# Patient Record
Sex: MALE | Race: WHITE | NOT HISPANIC OR LATINO | Employment: FULL TIME | URBAN - METROPOLITAN AREA
[De-identification: names, ages, dates, MRNs, and addresses within clinical notes are randomized per-mention and may not be internally consistent; named-entity substitution may affect disease eponyms.]

---

## 2023-04-24 ENCOUNTER — OFFICE VISIT (OUTPATIENT)
Dept: PRIMARY CARE | Facility: CLINIC | Age: 58
End: 2023-04-24
Payer: COMMERCIAL

## 2023-04-24 VITALS — BODY MASS INDEX: 32.32 KG/M2 | SYSTOLIC BLOOD PRESSURE: 126 MMHG | WEIGHT: 245 LBS | DIASTOLIC BLOOD PRESSURE: 80 MMHG

## 2023-04-24 DIAGNOSIS — E78.00 ELEVATED LDL CHOLESTEROL LEVEL: ICD-10-CM

## 2023-04-24 DIAGNOSIS — F10.10 ALCOHOL ABUSE: ICD-10-CM

## 2023-04-24 DIAGNOSIS — Q23.1 BICUSPID AORTIC VALVE (HHS-HCC): ICD-10-CM

## 2023-04-24 DIAGNOSIS — F39 MOOD DISORDER (CMS-HCC): Primary | ICD-10-CM

## 2023-04-24 PROBLEM — I77.819 DILATATION OF AORTA (CMS-HCC): Status: ACTIVE | Noted: 2023-04-24

## 2023-04-24 PROBLEM — I25.10 CORONARY ARTERY CALCIFICATION SEEN ON CAT SCAN: Status: ACTIVE | Noted: 2023-04-24

## 2023-04-24 PROBLEM — Q23.81 BICUSPID AORTIC VALVE: Status: ACTIVE | Noted: 2023-04-24

## 2023-04-24 PROBLEM — G47.00 PERSISTENT INSOMNIA: Status: ACTIVE | Noted: 2023-04-24

## 2023-04-24 PROBLEM — B35.1 ONYCHOMYCOSIS: Status: ACTIVE | Noted: 2023-04-24

## 2023-04-24 PROCEDURE — 99214 OFFICE O/P EST MOD 30 MIN: CPT | Performed by: FAMILY MEDICINE

## 2023-04-24 PROCEDURE — 1036F TOBACCO NON-USER: CPT | Performed by: FAMILY MEDICINE

## 2023-04-24 RX ORDER — DISULFIRAM 250 MG/1
250 TABLET ORAL DAILY
Qty: 90 TABLET | Refills: 1 | Status: SHIPPED | OUTPATIENT
Start: 2023-04-24 | End: 2023-09-27 | Stop reason: SDUPTHER

## 2023-04-24 RX ORDER — ROSUVASTATIN CALCIUM 10 MG/1
1 TABLET, COATED ORAL DAILY
COMMUNITY
Start: 2023-04-17 | End: 2023-10-10 | Stop reason: SDUPTHER

## 2023-04-24 RX ORDER — QUETIAPINE FUMARATE 25 MG/1
25 TABLET, FILM COATED ORAL NIGHTLY
Qty: 90 TABLET | Refills: 1 | Status: SHIPPED | OUTPATIENT
Start: 2023-04-24 | End: 2023-09-27 | Stop reason: SDUPTHER

## 2023-04-24 RX ORDER — DISULFIRAM 250 MG/1
1 TABLET ORAL DAILY
COMMUNITY
End: 2023-04-24 | Stop reason: SDUPTHER

## 2023-04-24 RX ORDER — QUETIAPINE FUMARATE 25 MG/1
25 TABLET, FILM COATED ORAL NIGHTLY
COMMUNITY
End: 2023-04-24 | Stop reason: SDUPTHER

## 2023-04-24 ASSESSMENT — PATIENT HEALTH QUESTIONNAIRE - PHQ9
1. LITTLE INTEREST OR PLEASURE IN DOING THINGS: NOT AT ALL
SUM OF ALL RESPONSES TO PHQ9 QUESTIONS 1 AND 2: 0
2. FEELING DOWN, DEPRESSED OR HOPELESS: NOT AT ALL

## 2023-04-24 NOTE — PROGRESS NOTES
Subjective   Blayne Covarrubias is a 57 y.o. male who presents for Alcohol Problem    HPI  1.  Mood disorder  Currently taking Seroquel 25 mg at bedtime  Stable at this medication dosage and would continue    2.  Alcohol abuse  Currently taking Antabuse 250 mg daily  Like to continue with this as he has been on it for a long time    3.  Elevated LDL cholesterol  Started on rosuvastatin 10 mg daily by cardiology recently.  Currently tolerating medication without side effects, including myalgias    4.  Bicuspid aortic valve  Found incidentally on echo by cardiology recently.  Does also have moderate aortic insufficiency.  Cardiologist would like to see him yearly for periodic monitoring    ROS: All pertinent positive symptoms are included in the history of present illness.    All other systems have been reviewed and are negative and noncontributory to this patient's current ailments.    Objective     Vitals:    04/24/23 1346   BP: 126/80   Weight: 111 kg (245 lb)       Physical Exam  CONSTITUTIONAL - well nourished, well developed, looks like stated age, in no acute distress, not ill-appearing, and not tired appearing  SKIN - No lesions or rashes visualized.   HEAD - Atraumatic, normocephalic.  EYES - EOMI with normal external exam  RESP - clear to auscultation, no wheezing, no crackles and no rales  CARDIAC - regular rate and rhythm, 2/6 systolic murmur, no skipped beats  EXTREMITIES - no edema, no deformities  PSYCHIATRIC - alert, oriented to time, place, person and no difficulty with speech or language      Assessment/Plan   Problem List Items Addressed This Visit       Alcohol abuse     Continue disulfiram 250 mg daily         Relevant Medications    disulfiram (Antabuse) 250 mg tablet    Elevated LDL cholesterol level     Continue rosuvastatin 10 mg daily as prescribed by cardiology         Mood disorder (CMS/[facility]) - Primary     Continue Seroquel 25 mg at bedtime         Relevant Medications    QUEtiapine (SEROquel) 25  mg tablet    Bicuspid aortic valve     Continue following with Dr. Saavedra as he recommends for monitoring

## 2023-09-27 ENCOUNTER — OFFICE VISIT (OUTPATIENT)
Dept: PRIMARY CARE | Facility: CLINIC | Age: 58
End: 2023-09-27
Payer: COMMERCIAL

## 2023-09-27 VITALS
SYSTOLIC BLOOD PRESSURE: 128 MMHG | HEART RATE: 70 BPM | DIASTOLIC BLOOD PRESSURE: 80 MMHG | WEIGHT: 243 LBS | BODY MASS INDEX: 32.06 KG/M2

## 2023-09-27 DIAGNOSIS — E78.2 MIXED HYPERLIPIDEMIA: Primary | ICD-10-CM

## 2023-09-27 DIAGNOSIS — F10.10 ALCOHOL ABUSE: ICD-10-CM

## 2023-09-27 DIAGNOSIS — F39 MOOD DISORDER (CMS-HCC): ICD-10-CM

## 2023-09-27 DIAGNOSIS — G47.00 PERSISTENT INSOMNIA: ICD-10-CM

## 2023-09-27 PROBLEM — F10.11 HISTORY OF ALCOHOL ABUSE: Status: RESOLVED | Noted: 2023-09-27 | Resolved: 2023-09-27

## 2023-09-27 PROBLEM — I35.1 NONRHEUMATIC AORTIC (VALVE) INSUFFICIENCY: Status: ACTIVE | Noted: 2023-09-27

## 2023-09-27 PROBLEM — F10.11 HISTORY OF ALCOHOL ABUSE: Status: ACTIVE | Noted: 2023-09-27

## 2023-09-27 PROBLEM — I25.10 ATHEROSCLEROTIC HEART DISEASE OF NATIVE CORONARY ARTERY WITHOUT ANGINA PECTORIS: Status: ACTIVE | Noted: 2023-09-27

## 2023-09-27 PROCEDURE — 1036F TOBACCO NON-USER: CPT | Performed by: FAMILY MEDICINE

## 2023-09-27 PROCEDURE — 99214 OFFICE O/P EST MOD 30 MIN: CPT | Performed by: FAMILY MEDICINE

## 2023-09-27 RX ORDER — QUETIAPINE FUMARATE 25 MG/1
25 TABLET, FILM COATED ORAL NIGHTLY
Qty: 90 TABLET | Refills: 1 | Status: SHIPPED | OUTPATIENT
Start: 2023-09-27 | End: 2023-10-20 | Stop reason: SDUPTHER

## 2023-09-27 RX ORDER — DISULFIRAM 250 MG/1
250 TABLET ORAL DAILY
Qty: 90 TABLET | Refills: 1 | Status: SHIPPED | OUTPATIENT
Start: 2023-09-27 | End: 2023-10-20 | Stop reason: SDUPTHER

## 2023-09-27 ASSESSMENT — PATIENT HEALTH QUESTIONNAIRE - PHQ9
2. FEELING DOWN, DEPRESSED OR HOPELESS: NOT AT ALL
SUM OF ALL RESPONSES TO PHQ9 QUESTIONS 1 AND 2: 0
1. LITTLE INTEREST OR PLEASURE IN DOING THINGS: NOT AT ALL

## 2023-09-27 NOTE — PROGRESS NOTES
Subjective   Patient ID: Blayne Covarrubias is a 57 y.o. male who presents for Insomnia and Hyperlipidemia.    HPI  1.  Alcohol abuse history.  Sober for roughly 20 years  Uses Disulfiram 250mg every day  Initially prescribed by psychiatry, and it is continued through this office  At this juncture, not willing to discontinue    2.  Insomnia.  Controlled on Quetiapine 25mg every day  Unable to fall sleep without it, requesting refill    3.  Hyperlipidemia.  Recently started on Rosuvastatin 10mg every day cardiology  Denies any medication side effects including myalgias  Denies ACS symptoms    Review of Systems  All pertinent positive symptoms are included in the history of present illness.    All other systems have been reviewed and are negative and noncontributory to this patient's current ailments.     No Known Allergies     Immunization History   Administered Date(s) Administered    Flu vaccine (IIV4), preservative free *Check age/dose* 10/09/2018    Influenza, injectable, MDCK, preservative free, quadrivalent 12/14/2021, 10/27/2022    Influenza, injectable, quadrivalent 09/20/2019    Influenza, seasonal, injectable, preservative free 09/20/2015, 09/25/2016    Pfizer COVID-19 vaccine, bivalent, age 12 years and older (30 mcg/0.3 mL) 10/27/2022    Pfizer Purple Cap SARS-CoV-2 03/22/2021, 04/19/2021, 12/14/2021    Tdap vaccine, age 7 year and older (BOOSTRIX) 11/25/2015, 05/27/2021    Zoster vaccine, recombinant, adult (SHINGRIX) 12/30/2022       Objective   Vitals:    09/27/23 0946   BP: 128/80   Pulse: 70   Weight: 110 kg (243 lb)       Physical Exam  CONSTITUTIONAL - well nourished, well developed, looks like stated age, in no acute distress, not ill-appearing, and not tired appearing  SKIN - normal skin color and pigmentation, normal skin turgor without rash, lesions, or nodules visualized  HEAD - no trauma, normocephalic  NECK - supple without rigidity, no neck mass was observed, no thyromegaly or thyroid  nodules  CHEST - clear to auscultation, no wheezing, no crackles and no rales, good effort  CARDIAC - regular rate and regular rhythm, no skipped beats, no murmur  EXTREMITIES - no edema, no deformities  NEUROLOGICAL - normal gait, normal balance, normal motor, no ataxia, alert, oriented and no focal signs  PSYCHIATRIC - alert, pleasant and cordial, age-appropriate  IMMUNOLOGIC - no cervical lymphadenopathy     Assessment/Plan   Problem List Items Addressed This Visit       Alcohol abuse     Stable, no changes to medication recommended         Relevant Medications    disulfiram (Antabuse) 250 mg tablet    Mood disorder (CMS/HCC)     Stable, no changes to medication recommended         Relevant Medications    QUEtiapine (SEROquel) 25 mg tablet    Persistent insomnia     Stable. No changes to medical management          Mixed hyperlipidemia - Primary     Stable. No changes to medical management   Continue to follow with cardiology per protocol         Relevant Orders    Comprehensive metabolic panel    Lipid panel     I reviewed Dr. Harris's documentation and discussed the patient with the attendee. I agree with the attendee's medical decision making as documented on the attendee's note.

## 2023-09-27 NOTE — ASSESSMENT & PLAN NOTE
Chief Complaint   Patient presents with    Flank Pain     right flank pain and hematuria onset last night, today tripped and fell onto right side on bladder stimulator and thinks in moved, down for approx 45 min until EMS arrived and assisted her up, denies hitting head or loc    Fall Stable. No changes to medical management   Continue to follow with cardiology per protocol

## 2023-10-09 ENCOUNTER — LAB (OUTPATIENT)
Dept: LAB | Facility: LAB | Age: 58
End: 2023-10-09
Payer: COMMERCIAL

## 2023-10-09 DIAGNOSIS — E78.2 MIXED HYPERLIPIDEMIA: ICD-10-CM

## 2023-10-09 LAB
ALBUMIN SERPL BCP-MCNC: 4.4 G/DL (ref 3.4–5)
ALP SERPL-CCNC: 46 U/L (ref 33–120)
ALT SERPL W P-5'-P-CCNC: 26 U/L (ref 10–52)
ANION GAP SERPL CALC-SCNC: 10 MMOL/L (ref 10–20)
AST SERPL W P-5'-P-CCNC: 16 U/L (ref 9–39)
BILIRUB SERPL-MCNC: 0.7 MG/DL (ref 0–1.2)
BUN SERPL-MCNC: 19 MG/DL (ref 6–23)
CALCIUM SERPL-MCNC: 9.6 MG/DL (ref 8.6–10.3)
CHLORIDE SERPL-SCNC: 105 MMOL/L (ref 98–107)
CHOLEST SERPL-MCNC: 125 MG/DL (ref 0–199)
CHOLESTEROL/HDL RATIO: 2.8
CO2 SERPL-SCNC: 27 MMOL/L (ref 21–32)
CREAT SERPL-MCNC: 1.3 MG/DL (ref 0.5–1.3)
GFR SERPL CREATININE-BSD FRML MDRD: 63 ML/MIN/1.73M*2
GLUCOSE SERPL-MCNC: 81 MG/DL (ref 74–99)
HDLC SERPL-MCNC: 44.4 MG/DL
LDLC SERPL CALC-MCNC: 66 MG/DL (ref 140–190)
NON HDL CHOLESTEROL: 81 MG/DL (ref 0–149)
POTASSIUM SERPL-SCNC: 4 MMOL/L (ref 3.5–5.3)
PROT SERPL-MCNC: 6.7 G/DL (ref 6.4–8.2)
SODIUM SERPL-SCNC: 138 MMOL/L (ref 136–145)
TRIGL SERPL-MCNC: 74 MG/DL (ref 0–149)
VLDL: 15 MG/DL (ref 0–40)

## 2023-10-09 PROCEDURE — 36415 COLL VENOUS BLD VENIPUNCTURE: CPT

## 2023-10-09 PROCEDURE — 80053 COMPREHEN METABOLIC PANEL: CPT

## 2023-10-09 PROCEDURE — 80061 LIPID PANEL: CPT

## 2023-10-10 DIAGNOSIS — E78.00 ELEVATED LDL CHOLESTEROL LEVEL: Primary | ICD-10-CM

## 2023-10-10 RX ORDER — ROSUVASTATIN CALCIUM 10 MG/1
10 TABLET, COATED ORAL DAILY
Qty: 90 TABLET | Refills: 1 | Status: SHIPPED | OUTPATIENT
Start: 2023-10-10 | End: 2024-01-10 | Stop reason: SDUPTHER

## 2023-10-10 NOTE — RESULT ENCOUNTER NOTE
Cholesterol, sugar, kidney, liver and electrolytes are excellent.  No changes to medication recommended that I sent the medication over to the pharmacy for 6 months just now

## 2023-10-15 DIAGNOSIS — F10.10 ALCOHOL ABUSE: ICD-10-CM

## 2023-10-15 DIAGNOSIS — F39 MOOD DISORDER (CMS-HCC): ICD-10-CM

## 2023-10-17 RX ORDER — QUETIAPINE FUMARATE 25 MG/1
25 TABLET, FILM COATED ORAL NIGHTLY
Qty: 90 TABLET | Refills: 0 | OUTPATIENT
Start: 2023-10-17 | End: 2024-04-14

## 2023-10-21 RX ORDER — QUETIAPINE FUMARATE 25 MG/1
25 TABLET, FILM COATED ORAL NIGHTLY
Qty: 90 TABLET | Refills: 1 | Status: SHIPPED | OUTPATIENT
Start: 2023-10-21 | End: 2024-04-03

## 2023-10-21 RX ORDER — DISULFIRAM 250 MG/1
250 TABLET ORAL DAILY
Qty: 90 TABLET | Refills: 1 | Status: SHIPPED | OUTPATIENT
Start: 2023-10-21 | End: 2024-04-25 | Stop reason: SDUPTHER

## 2024-01-09 DIAGNOSIS — E78.00 ELEVATED LDL CHOLESTEROL LEVEL: ICD-10-CM

## 2024-01-10 RX ORDER — ROSUVASTATIN CALCIUM 10 MG/1
10 TABLET, COATED ORAL DAILY
Qty: 90 TABLET | Refills: 0 | Status: SHIPPED | OUTPATIENT
Start: 2024-01-10 | End: 2024-04-09

## 2024-03-25 ENCOUNTER — OFFICE VISIT (OUTPATIENT)
Dept: CARDIOLOGY | Facility: CLINIC | Age: 59
End: 2024-03-25
Payer: COMMERCIAL

## 2024-03-25 ENCOUNTER — HOSPITAL ENCOUNTER (OUTPATIENT)
Dept: CARDIOLOGY | Facility: CLINIC | Age: 59
Discharge: HOME | End: 2024-03-25
Payer: COMMERCIAL

## 2024-03-25 VITALS
WEIGHT: 234 LBS | BODY MASS INDEX: 29.09 KG/M2 | TEMPERATURE: 98 F | HEART RATE: 73 BPM | HEIGHT: 75 IN | DIASTOLIC BLOOD PRESSURE: 71 MMHG | SYSTOLIC BLOOD PRESSURE: 112 MMHG

## 2024-03-25 DIAGNOSIS — I25.10 ATHEROSCLEROSIS OF NATIVE CORONARY ARTERY OF NATIVE HEART WITHOUT ANGINA PECTORIS: ICD-10-CM

## 2024-03-25 DIAGNOSIS — E78.2 MIXED HYPERLIPIDEMIA: ICD-10-CM

## 2024-03-25 DIAGNOSIS — Q23.1 BICUSPID AORTIC VALVE (HHS-HCC): ICD-10-CM

## 2024-03-25 DIAGNOSIS — Q23.0 CONGENITAL STENOSIS OF AORTIC VALVE (HHS-HCC): ICD-10-CM

## 2024-03-25 DIAGNOSIS — I25.10 ATHEROSCLEROSIS OF NATIVE CORONARY ARTERY OF NATIVE HEART, UNSPECIFIED WHETHER ANGINA PRESENT: ICD-10-CM

## 2024-03-25 DIAGNOSIS — Q23.1 CONGENITAL INSUFFICIENCY OF AORTIC VALVE (HHS-HCC): ICD-10-CM

## 2024-03-25 DIAGNOSIS — I25.10 CORONARY ARTERY CALCIFICATION SEEN ON CAT SCAN: Primary | ICD-10-CM

## 2024-03-25 DIAGNOSIS — I71.21 ANEURYSM OF ASCENDING AORTA WITHOUT RUPTURE (CMS-HCC): ICD-10-CM

## 2024-03-25 DIAGNOSIS — R93.1 AGATSTON CORONARY ARTERY CALCIUM SCORE BETWEEN 100 AND 199: ICD-10-CM

## 2024-03-25 PROBLEM — E78.00 ELEVATED LDL CHOLESTEROL LEVEL: Status: RESOLVED | Noted: 2023-04-24 | Resolved: 2024-03-25

## 2024-03-25 PROBLEM — I77.819 DILATATION OF AORTA (CMS-HCC): Status: RESOLVED | Noted: 2023-04-24 | Resolved: 2024-03-25

## 2024-03-25 LAB
AORTIC VALVE MEAN GRADIENT: 8.8 MMHG
AORTIC VALVE PEAK VELOCITY: 2.02 M/S
AV PEAK GRADIENT: 16.3 MMHG
AVA (PEAK VEL): 2.28 CM2
AVA (VTI): 2.56 CM2
EJECTION FRACTION APICAL 4 CHAMBER: 67.9
EJECTION FRACTION: 68 %
LEFT ATRIUM VOLUME AREA LENGTH INDEX BSA: 19.8 ML/M2
LEFT VENTRICLE INTERNAL DIMENSION DIASTOLE: 5.44 CM (ref 3.5–6)
LEFT VENTRICULAR OUTFLOW TRACT DIAMETER: 2.37 CM
MITRAL VALVE E/A RATIO: 1.67
MITRAL VALVE E/E' RATIO: 6.55
RIGHT VENTRICLE FREE WALL PEAK S': 11 CM/S
RIGHT VENTRICLE PEAK SYSTOLIC PRESSURE: 27.6 MMHG
TRICUSPID ANNULAR PLANE SYSTOLIC EXCURSION: 2 CM

## 2024-03-25 PROCEDURE — 93306 TTE W/DOPPLER COMPLETE: CPT | Performed by: INTERNAL MEDICINE

## 2024-03-25 PROCEDURE — 99214 OFFICE O/P EST MOD 30 MIN: CPT | Performed by: INTERNAL MEDICINE

## 2024-03-25 PROCEDURE — 93005 ELECTROCARDIOGRAM TRACING: CPT

## 2024-03-25 PROCEDURE — 1036F TOBACCO NON-USER: CPT | Performed by: INTERNAL MEDICINE

## 2024-03-25 PROCEDURE — 93306 TTE W/DOPPLER COMPLETE: CPT

## 2024-03-25 NOTE — PROGRESS NOTES
Chief Complaint:   Valve Disorder     History of Present Illness     Blayne Covarrubias is a 58 y.o. male presenting with aortic regurgitation from BAV.  The patient has been well since their last appointment and has no cardiac complaints.  The patient denies chest pain, shortness of breath, or syncope.  Their most recent echocardiogram demonstrates moderate aortic regurgitation with normal left ventricular systolic function.  Exercising.      Review of Systems  All pertinent systems have been reviewed and are negative except for what is stated in the history of present illness.    All other systems have been reviewed and are negative and noncontributory to this patient's current ailments.  .       Previous History     Past Medical History:  He has a past medical history of Acute prostatitis (03/08/2017), Agatston coronary artery calcium score between 100 and 199 (03/25/2024), Alcohol dependence, uncomplicated (CMS/[facility]) (01/17/2018), Aneurysm of ascending aorta without rupture (CMS/[facility]) (03/25/2024), Personal history of other specified conditions, Personal history of other specified conditions (11/12/2019), Unspecified abnormal findings in urine (03/08/2017), Urgency of urination (03/08/2017), and Urinary tract infection, site not specified (03/08/2017).    Past Surgical History:  He has a past surgical history that includes Other surgical history (10/23/2014); Other surgical history (10/26/2018); Other surgical history (10/26/2018); Other surgical history (10/26/2018); Other surgical history (11/12/2018); CT guided percutaneous biopsy lung (10/18/2018); and CT guided chest tube placement (10/18/2018).      Social History:  He reports that he has never smoked. He has never used smokeless tobacco. He reports that he does not currently use alcohol. He reports that he does not use drugs.    Family History:  No family history on file.     Allergies:  Patient has no known allergies.    Outpatient Medications:  Current  "Outpatient Medications   Medication Instructions    disulfiram (ANTABUSE) 250 mg, oral, Daily    QUEtiapine (SEROQUEL) 25 mg, oral, Nightly    rosuvastatin (CRESTOR) 10 mg, oral, Daily       Physical Examination   Vitals:  Visit Vitals  /71 (BP Location: Right arm)   Pulse 73   Temp 36.7 °C (98 °F)   Ht 1.905 m (6' 3\")   Wt 106 kg (234 lb)   BMI 29.25 kg/m²   Smoking Status Never   BSA 2.37 m²      Physical Exam  Vitals reviewed.   Constitutional:       General: He is not in acute distress.     Appearance: Normal appearance.   HENT:      Head: Normocephalic and atraumatic.      Nose: Nose normal.   Eyes:      Conjunctiva/sclera: Conjunctivae normal.   Cardiovascular:      Rate and Rhythm: Normal rate and regular rhythm.      Pulses: Normal pulses.      Heart sounds: Murmur heard.      Diastolic murmur is present with a grade of 2/4.   Pulmonary:      Effort: Pulmonary effort is normal. No respiratory distress.      Breath sounds: Normal breath sounds. No wheezing, rhonchi or rales.   Abdominal:      General: Bowel sounds are normal. There is no distension.      Palpations: Abdomen is soft.      Tenderness: There is no abdominal tenderness.   Musculoskeletal:         General: No swelling.      Right lower leg: No edema.      Left lower leg: No edema.   Skin:     General: Skin is warm and dry.      Capillary Refill: Capillary refill takes less than 2 seconds.   Neurological:      General: No focal deficit present.      Mental Status: He is alert.   Psychiatric:         Mood and Affect: Mood normal.            Labs/Imaging/Cardiac Studies     Last Labs:  CBC -  Lab Results   Component Value Date    WBC 6.5 05/17/2022    HGB 17.0 05/17/2022    HCT 50.0 05/17/2022    MCV 88 05/17/2022     05/17/2022       CMP -  Lab Results   Component Value Date    CALCIUM 9.6 10/09/2023    PROT 6.7 10/09/2023    ALBUMIN 4.4 10/09/2023    AST 16 10/09/2023    ALT 26 10/09/2023    ALKPHOS 46 10/09/2023    BILITOT 0.7 " "10/09/2023       LIPID PANEL -   Lab Results   Component Value Date    CHOL 125 10/09/2023    HDL 44.4 10/09/2023    CHHDL 2.8 10/09/2023    VLDL 15 10/09/2023    TRIG 74 10/09/2023    NHDL 81 10/09/2023       RENAL FUNCTION PANEL -   Lab Results   Component Value Date    K 4.0 10/09/2023       No results found for: \"BNP\", \"HGBA1C\"    ECG:    Echo:  No echocardiogram results found for the past 12 months       Assessment and Recommendations     Assessment/Plan   1. Coronary artery calcification seen on CAT scan  As below.  - ECG 12 lead (Clinic Performed)  - Transthoracic echo (TTE) complete; Future  - Follow Up In Cardiology; Future    2. Agatston coronary artery calcium score between 100 and 199  The patient's CAD, as detailed in the HPI, has been clinically stable, without any anginal symptoms or dyspnea.  The patient will continue treatment with guideline-directed medical therapy with statin medications and was advised regular exercise and a heart healthy diet.        - Transthoracic echo (TTE) complete; Future  - Follow Up In Cardiology; Future    3. Aneurysm of ascending aorta without rupture (CMS/HCC)  Stable and asymptomatic ascending aortic aneurysm with unchanged size of the aneurysm by recent imaging.  There is no indication for surgical repair. The patient will be schedule for annual surveillance imaging.    - Transthoracic echo (TTE) complete; Future  - Follow Up In Cardiology; Future    4. Bicuspid aortic valve  Stable moderate AI and no stenosis, mild TAA.  - Transthoracic echo (TTE) complete; Future  - Follow Up In Cardiology; Future    5. Mixed hyperlipidemia  Excellent LDLC on statin.  - Transthoracic echo (TTE) complete; Future  - Follow Up In Cardiology; Future         Vinicius Saavedra MD    Exclusive of any other services or procedures performed, I, Vinicius Saavedra MD , spent 30 minutes in duration for this visit today.  This time consisted of chart review, obtaining history, and/or performing the " exam as documented above as well as documenting the clinical information for the encounter in the electronic record, discussing treatment options, plans, and/or goals with patient, family, and/or caregiver, refilling medications, updating the electronic record, ordering medicines, lab work, imaging, referrals, and/or procedures as documented above and communicating with other [facility] professionals. I have discussed the results of laboratory, radiology, and cardiology studies with the patient and their family/caregiver.

## 2024-04-02 DIAGNOSIS — F39 MOOD DISORDER (CMS-HCC): ICD-10-CM

## 2024-04-03 RX ORDER — QUETIAPINE FUMARATE 25 MG/1
25 TABLET, FILM COATED ORAL NIGHTLY
Qty: 30 TABLET | Refills: 0 | Status: SHIPPED | OUTPATIENT
Start: 2024-04-03 | End: 2024-04-25 | Stop reason: SDUPTHER

## 2024-04-08 DIAGNOSIS — E78.00 ELEVATED LDL CHOLESTEROL LEVEL: ICD-10-CM

## 2024-04-09 RX ORDER — ROSUVASTATIN CALCIUM 10 MG/1
10 TABLET, COATED ORAL DAILY
Qty: 90 TABLET | Refills: 3 | Status: SHIPPED | OUTPATIENT
Start: 2024-04-09

## 2024-04-25 ENCOUNTER — OFFICE VISIT (OUTPATIENT)
Dept: PRIMARY CARE | Facility: CLINIC | Age: 59
End: 2024-04-25
Payer: COMMERCIAL

## 2024-04-25 ENCOUNTER — LAB (OUTPATIENT)
Dept: LAB | Facility: LAB | Age: 59
End: 2024-04-25
Payer: COMMERCIAL

## 2024-04-25 VITALS
HEIGHT: 75 IN | HEART RATE: 66 BPM | DIASTOLIC BLOOD PRESSURE: 72 MMHG | WEIGHT: 230 LBS | SYSTOLIC BLOOD PRESSURE: 120 MMHG | BODY MASS INDEX: 28.6 KG/M2

## 2024-04-25 DIAGNOSIS — R31.21 ASYMPTOMATIC MICROSCOPIC HEMATURIA: ICD-10-CM

## 2024-04-25 DIAGNOSIS — F10.10 ALCOHOL ABUSE: ICD-10-CM

## 2024-04-25 DIAGNOSIS — Z00.00 ANNUAL PHYSICAL EXAM: ICD-10-CM

## 2024-04-25 DIAGNOSIS — Z12.5 PROSTATE CANCER SCREENING: ICD-10-CM

## 2024-04-25 DIAGNOSIS — I71.21 ANEURYSM OF ASCENDING AORTA WITHOUT RUPTURE (CMS-HCC): ICD-10-CM

## 2024-04-25 DIAGNOSIS — E78.2 MIXED HYPERLIPIDEMIA: ICD-10-CM

## 2024-04-25 DIAGNOSIS — G47.00 PERSISTENT INSOMNIA: ICD-10-CM

## 2024-04-25 DIAGNOSIS — Z00.00 ANNUAL PHYSICAL EXAM: Primary | ICD-10-CM

## 2024-04-25 DIAGNOSIS — Z23 NEED FOR SHINGLES VACCINE: ICD-10-CM

## 2024-04-25 DIAGNOSIS — F39 MOOD DISORDER (CMS-HCC): ICD-10-CM

## 2024-04-25 PROBLEM — B35.1 ONYCHOMYCOSIS: Status: RESOLVED | Noted: 2023-04-24 | Resolved: 2024-04-25

## 2024-04-25 PROBLEM — R05.3 CHRONIC COUGH: Status: RESOLVED | Noted: 2024-04-25 | Resolved: 2024-04-25

## 2024-04-25 PROBLEM — J98.4 DISORDER OF LUNG: Status: ACTIVE | Noted: 2024-04-25

## 2024-04-25 PROBLEM — R05.3 CHRONIC COUGH: Status: ACTIVE | Noted: 2024-04-25

## 2024-04-25 PROBLEM — J98.4 DISORDER OF LUNG: Status: RESOLVED | Noted: 2024-04-25 | Resolved: 2024-04-25

## 2024-04-25 LAB
ALBUMIN SERPL BCP-MCNC: 4.3 G/DL (ref 3.4–5)
ALP SERPL-CCNC: 52 U/L (ref 33–120)
ALT SERPL W P-5'-P-CCNC: 24 U/L (ref 10–52)
ANION GAP SERPL CALC-SCNC: 12 MMOL/L (ref 10–20)
AST SERPL W P-5'-P-CCNC: 19 U/L (ref 9–39)
BASOPHILS # BLD AUTO: 0.03 X10*3/UL (ref 0–0.1)
BASOPHILS NFR BLD AUTO: 0.6 %
BILIRUB SERPL-MCNC: 0.5 MG/DL (ref 0–1.2)
BUN SERPL-MCNC: 18 MG/DL (ref 6–23)
CALCIUM SERPL-MCNC: 8.9 MG/DL (ref 8.6–10.3)
CHLORIDE SERPL-SCNC: 104 MMOL/L (ref 98–107)
CHOLEST SERPL-MCNC: 95 MG/DL (ref 0–199)
CHOLESTEROL/HDL RATIO: 3
CO2 SERPL-SCNC: 26 MMOL/L (ref 21–32)
CREAT SERPL-MCNC: 1.2 MG/DL (ref 0.5–1.3)
EGFRCR SERPLBLD CKD-EPI 2021: 70 ML/MIN/1.73M*2
EOSINOPHIL # BLD AUTO: 0.19 X10*3/UL (ref 0–0.7)
EOSINOPHIL NFR BLD AUTO: 4.1 %
ERYTHROCYTE [DISTWIDTH] IN BLOOD BY AUTOMATED COUNT: 12.1 % (ref 11.5–14.5)
GLUCOSE SERPL-MCNC: 67 MG/DL (ref 74–99)
HCT VFR BLD AUTO: 47.5 % (ref 41–52)
HDLC SERPL-MCNC: 31.7 MG/DL
HGB BLD-MCNC: 16 G/DL (ref 13.5–17.5)
IMM GRANULOCYTES # BLD AUTO: 0.01 X10*3/UL (ref 0–0.7)
IMM GRANULOCYTES NFR BLD AUTO: 0.2 % (ref 0–0.9)
LDLC SERPL CALC-MCNC: 46 MG/DL
LYMPHOCYTES # BLD AUTO: 1.63 X10*3/UL (ref 1.2–4.8)
LYMPHOCYTES NFR BLD AUTO: 35.1 %
MCH RBC QN AUTO: 30.2 PG (ref 26–34)
MCHC RBC AUTO-ENTMCNC: 33.7 G/DL (ref 32–36)
MCV RBC AUTO: 90 FL (ref 80–100)
MONOCYTES # BLD AUTO: 0.4 X10*3/UL (ref 0.1–1)
MONOCYTES NFR BLD AUTO: 8.6 %
NEUTROPHILS # BLD AUTO: 2.38 X10*3/UL (ref 1.2–7.7)
NEUTROPHILS NFR BLD AUTO: 51.4 %
NON HDL CHOLESTEROL: 63 MG/DL (ref 0–149)
NRBC BLD-RTO: 0 /100 WBCS (ref 0–0)
PLATELET # BLD AUTO: 224 X10*3/UL (ref 150–450)
POC APPEARANCE, URINE: ABNORMAL
POC BILIRUBIN, URINE: NEGATIVE
POC BLOOD, URINE: ABNORMAL
POC COLOR, URINE: YELLOW
POC GLUCOSE, URINE: NEGATIVE MG/DL
POC KETONES, URINE: NEGATIVE MG/DL
POC LEUKOCYTES, URINE: ABNORMAL
POC NITRITE,URINE: POSITIVE
POC PH, URINE: 6.5 PH
POC PROTEIN, URINE: NEGATIVE MG/DL
POC SPECIFIC GRAVITY, URINE: >=1.03
POC UROBILINOGEN, URINE: 0.2 EU/DL
POTASSIUM SERPL-SCNC: 4.1 MMOL/L (ref 3.5–5.3)
PROT SERPL-MCNC: 6.9 G/DL (ref 6.4–8.2)
PSA SERPL-MCNC: 1.29 NG/ML
RBC # BLD AUTO: 5.3 X10*6/UL (ref 4.5–5.9)
SODIUM SERPL-SCNC: 138 MMOL/L (ref 136–145)
TRIGL SERPL-MCNC: 88 MG/DL (ref 0–149)
TSH SERPL-ACNC: 2.14 MIU/L (ref 0.44–3.98)
VLDL: 18 MG/DL (ref 0–40)
WBC # BLD AUTO: 4.6 X10*3/UL (ref 4.4–11.3)

## 2024-04-25 PROCEDURE — 84443 ASSAY THYROID STIM HORMONE: CPT

## 2024-04-25 PROCEDURE — 85025 COMPLETE CBC W/AUTO DIFF WBC: CPT

## 2024-04-25 PROCEDURE — 99396 PREV VISIT EST AGE 40-64: CPT | Performed by: FAMILY MEDICINE

## 2024-04-25 PROCEDURE — 80053 COMPREHEN METABOLIC PANEL: CPT

## 2024-04-25 PROCEDURE — 80061 LIPID PANEL: CPT

## 2024-04-25 PROCEDURE — 99214 OFFICE O/P EST MOD 30 MIN: CPT | Performed by: FAMILY MEDICINE

## 2024-04-25 PROCEDURE — 1036F TOBACCO NON-USER: CPT | Performed by: FAMILY MEDICINE

## 2024-04-25 PROCEDURE — 84153 ASSAY OF PSA TOTAL: CPT

## 2024-04-25 PROCEDURE — 87086 URINE CULTURE/COLONY COUNT: CPT

## 2024-04-25 PROCEDURE — 90471 IMMUNIZATION ADMIN: CPT | Performed by: FAMILY MEDICINE

## 2024-04-25 PROCEDURE — 81002 URINALYSIS NONAUTO W/O SCOPE: CPT | Performed by: FAMILY MEDICINE

## 2024-04-25 PROCEDURE — 36415 COLL VENOUS BLD VENIPUNCTURE: CPT

## 2024-04-25 PROCEDURE — 90750 HZV VACC RECOMBINANT IM: CPT | Performed by: FAMILY MEDICINE

## 2024-04-25 RX ORDER — QUETIAPINE FUMARATE 25 MG/1
25 TABLET, FILM COATED ORAL NIGHTLY
Qty: 90 TABLET | Refills: 1 | Status: SHIPPED | OUTPATIENT
Start: 2024-04-25 | End: 2024-10-22

## 2024-04-25 RX ORDER — DISULFIRAM 250 MG/1
250 TABLET ORAL DAILY
Qty: 90 TABLET | Refills: 1 | Status: SHIPPED | OUTPATIENT
Start: 2024-04-25 | End: 2024-10-22

## 2024-04-25 NOTE — PROGRESS NOTES
Subjective   Patient ID: Blayne Covarrubias is a 58 y.o. male who presents for Annual Exam.    Past Medical, Surgical, and Family History reviewed and updated in chart.    Reviewed all medications by prescribing practitioner or clinical pharmacist (such as prescriptions, OTCs, herbal therapies and supplements) and documented in the medical record.    HPI  1.  Physical exam.  Colonoscopy 2023  Interested in having Shingrix updated, last done December 2022    2.  Alcohol abuse history.  Sober for 20+ years  Uses Disulfiram 250mg every day  Initially prescribed by psychiatry, and it has continued through this office  At this juncture, not willing to discontinue    3.  Insomnia.  Controlled on Quetiapine 25mg every day  Unable to fall sleep without it, requesting refill    4.  Hyperlipidemia.  Currently taking rosuvastatin 10 mg daily  Denies any medication side effects including myalgias  Denies ACS symptoms    5.  Ascending thoracic aorta aneurysm.  Continues to follow with cardiology annually  Recent echocardiogram done March 25, 2024  Ascending aortic diameter noted to be 4.03 cm    Review of Systems  All pertinent positive symptoms are included in the history of present illness.    All other systems have been reviewed and are negative and noncontributory to this patient's current ailments.    Past Medical History:   Diagnosis Date    Acute prostatitis 03/08/2017    Acute bacterial prostatitis    Agatston coronary artery calcium score between 100 and 199 03/25/2024     (2023)    Alcohol dependence, uncomplicated (Multi) 01/17/2018    Alcoholism, chronic    Aneurysm of ascending aorta without rupture (CMS-HCC) 03/25/2024    Asc Ao 4.1 (2023)    Personal history of other specified conditions     History of insomnia    Personal history of other specified conditions 11/12/2019    History of chronic cough    Unspecified abnormal findings in urine 03/08/2017    Malodorous urine    Urgency of urination 03/08/2017     "Urinary urgency    Urinary tract infection, site not specified 03/08/2017    Bacterial UTI     Past Surgical History:   Procedure Laterality Date    CT GUIDED CHEST TUBE PLACEMENT  10/18/2018    CT GUIDED CHEST TUBE PLACEMENT [place] [facility]    CT GUIDED PERCUTANEOUS BIOPSY LUNG  10/18/2018    CT GUIDED PERCUTANEOUS BIOPSY LUNG [place] [facility]    OTHER SURGICAL HISTORY  10/23/2014    Prior Surgical Procedure Not Done    OTHER SURGICAL HISTORY  10/26/2018    Biopsy Lung Percutaneous    OTHER SURGICAL HISTORY  10/26/2018    Bronchoscopy (Diagnostic)    OTHER SURGICAL HISTORY  10/26/2018    Chest Tube Insertion    OTHER SURGICAL HISTORY  11/12/2018    Lung segmental resection     Social History     Tobacco Use    Smoking status: Never    Smokeless tobacco: Never   Substance Use Topics    Alcohol use: Not Currently    Drug use: Never     No family history on file.  Immunization History   Administered Date(s) Administered    Flu vaccine (IIV4), preservative free *Check age/dose* 10/09/2018    Flu vaccine, quadrivalent, no egg protein, age 6 month or greater (FLUCELVAX) 12/14/2021, 10/27/2022    Influenza, injectable, quadrivalent 09/20/2019    Influenza, seasonal, injectable, preservative free 09/20/2015, 09/25/2016    Pfizer COVID-19 vaccine, bivalent, age 12 years and older (30 mcg/0.3 mL) 10/27/2022    Pfizer Purple Cap SARS-CoV-2 03/22/2021, 04/19/2021, 12/14/2021    Tdap vaccine, age 7 year and older (BOOSTRIX, ADACEL) 11/25/2015, 05/27/2021    Zoster vaccine, recombinant, adult (SHINGRIX) 12/30/2022, 04/25/2024     Current Outpatient Medications   Medication Instructions    disulfiram (ANTABUSE) 250 mg, oral, Daily    QUEtiapine (SEROQUEL) 25 mg, oral, Nightly    rosuvastatin (CRESTOR) 10 mg, oral, Daily     No Known Allergies    Objective   Vitals:    04/25/24 0806   BP: 120/72   Pulse: 66   Weight: 104 kg (230 lb)   Height: 1.905 m (6' 3\")     Body mass index is 28.75 kg/m².    BP Readings " from Last 3 Encounters:   04/25/24 120/72   03/25/24 112/71   09/27/23 128/80      Wt Readings from Last 3 Encounters:   04/25/24 104 kg (230 lb)   03/25/24 106 kg (234 lb)   09/27/23 110 kg (243 lb)        Office Visit on 04/25/2024   Component Date Value    POC Color, Urine 04/25/2024 Yellow     POC Appearance, Urine 04/25/2024 Cloudy (A)     POC Glucose, Urine 04/25/2024 NEGATIVE     POC Bilirubin, Urine 04/25/2024 NEGATIVE     POC Ketones, Urine 04/25/2024 NEGATIVE     POC Specific Gravity, Ur* 04/25/2024 >=1.030     POC Blood, Urine 04/25/2024 SMALL (1+) (A)     POC PH, Urine 04/25/2024 6.5     POC Protein, Urine 04/25/2024 NEGATIVE     POC Urobilinogen, Urine 04/25/2024 0.2     Poc Nitrite, Urine 04/25/2024 POSITIVE (A)     POC Leukocytes, Urine 04/25/2024 MODERATE (2+) (A)      Physical Exam  CONSTITUTIONAL - well nourished, well developed, looks like stated age, in no acute distress, not ill-appearing, and not tired appearing  SKIN - normal skin color and pigmentation, normal skin turgor without rash, lesions, or nodules visualized  HEAD - no trauma, normocephalic  EYES - pupils are equal and reactive to light, extraocular muscles are intact, and normal external exam  ENT - cerumen noted bilaterally, no signs of infection, uvula midline, normal tongue movement and throat normal, no exudate  CHEST - clear to auscultation, no wheezing, no crackles and no rales, good effort  CARDIAC - regular rate and regular rhythm, no skipped beats, diastolic murmur 2/4  ABDOMEN - no organomegaly, soft, nontender, nondistended, normal bowel sounds, no guarding/rebound/rigidity, negative McBurney sign and negative Acuna sign  EXTREMITIES - no obvious or evident edema, no obvious or evident deformities  NEUROLOGICAL - normal gait, normal balance, normal motor, no ataxia, DTRs equal and symmetrical; alert, oriented and no focal signs  PSYCHIATRIC - alert, pleasant and cordial, age-appropriate    Assessment/Plan   Problem List  Items Addressed This Visit       Alcohol abuse     Stable, no changes to medication recommended         Relevant Medications    disulfiram (Antabuse) 250 mg tablet    Mood disorder (CMS-HCC)     Stable, no changes to medication recommended         Relevant Medications    QUEtiapine (SEROquel) 25 mg tablet    Persistent insomnia     Stable, no changes to medication recommended         Mixed hyperlipidemia     Obtain fasting blood work today, continue current medication as prescribed by cardiology         Aneurysm of ascending aorta without rupture (CMS-HCC)     Continue to follow with cardiology per protocol         Annual physical exam - Primary     Complete history and physical examination was performed  We will notify of test results once available         Relevant Orders    Prostate Specific Antigen    Lipid Panel    TSH with reflex to Free T4 if abnormal    Comprehensive Metabolic Panel    CBC and Auto Differential    POCT UA (nonautomated) manually resulted (Completed)     Other Visit Diagnoses       Prostate cancer screening        Relevant Orders    Prostate Specific Antigen    Need for shingles vaccine        Relevant Orders    Zoster vaccine, recombinant, adult (SHINGRIX) (Completed)    Asymptomatic microscopic hematuria        Relevant Orders    Urine Culture

## 2024-04-25 NOTE — RESULT ENCOUNTER NOTE
Urinalysis reveals a small trace of blood along with nitrates we will send for urine culture to evaluate for underlying infection

## 2024-04-27 LAB — BACTERIA UR CULT: ABNORMAL

## 2024-04-28 DIAGNOSIS — R82.90 ABNORMAL FINDING ON URINALYSIS: Primary | ICD-10-CM

## 2024-04-28 NOTE — RESULT ENCOUNTER NOTE
Kojo, it is kind of interesting because your urinalysis had all sorts of things going on including blood, nitrites and leukocytes all of which are indicative of infection.  The urine culture revealed an infection called Streptococcus mitis. Generally, Streptococcus mitis/oralis does not cause disease in healthy individuals. However, if someone has ill health, it could potentially lead to infections.  I do not recall any urinary symptoms, but the fact that this is all present makes me wonder why.  What I would like to have you do is consider rechecking the urine in about 2 weeks and see if it disappears.  If this remains present with the blood, nitrates, and leukocytes, we really should consider getting a urology consult and finding out why these findings are there.  I have the requisition on the chart, all you need to do is show up to a lab of your choice at  and tell them who you are and they will collect the urine from you in the cup.

## 2024-07-11 DIAGNOSIS — F10.10 ALCOHOL ABUSE: ICD-10-CM

## 2024-07-11 RX ORDER — DISULFIRAM 250 MG/1
250 TABLET ORAL DAILY
Qty: 90 TABLET | Refills: 0 | Status: SHIPPED | OUTPATIENT
Start: 2024-07-11 | End: 2024-10-09

## 2024-09-09 ENCOUNTER — APPOINTMENT (OUTPATIENT)
Dept: PRIMARY CARE | Facility: CLINIC | Age: 59
End: 2024-09-09
Payer: COMMERCIAL

## 2024-09-09 VITALS
HEART RATE: 76 BPM | WEIGHT: 224 LBS | HEIGHT: 75 IN | SYSTOLIC BLOOD PRESSURE: 108 MMHG | BODY MASS INDEX: 27.85 KG/M2 | DIASTOLIC BLOOD PRESSURE: 70 MMHG

## 2024-09-09 DIAGNOSIS — F10.10 ALCOHOL ABUSE: ICD-10-CM

## 2024-09-09 DIAGNOSIS — E78.2 MIXED HYPERLIPIDEMIA: Primary | ICD-10-CM

## 2024-09-09 DIAGNOSIS — G47.00 PERSISTENT INSOMNIA: ICD-10-CM

## 2024-09-09 PROCEDURE — 1036F TOBACCO NON-USER: CPT | Performed by: FAMILY MEDICINE

## 2024-09-09 PROCEDURE — 99214 OFFICE O/P EST MOD 30 MIN: CPT | Performed by: FAMILY MEDICINE

## 2024-09-09 PROCEDURE — 3008F BODY MASS INDEX DOCD: CPT | Performed by: FAMILY MEDICINE

## 2024-09-09 RX ORDER — QUETIAPINE FUMARATE 25 MG/1
25 TABLET, FILM COATED ORAL NIGHTLY
Qty: 90 TABLET | Refills: 1 | Status: SHIPPED | OUTPATIENT
Start: 2024-09-09 | End: 2025-03-08

## 2024-09-09 RX ORDER — DISULFIRAM 250 MG/1
250 TABLET ORAL DAILY
Qty: 90 TABLET | Refills: 1 | Status: SHIPPED | OUTPATIENT
Start: 2024-09-09 | End: 2025-03-08

## 2024-09-09 RX ORDER — ROSUVASTATIN CALCIUM 10 MG/1
10 TABLET, COATED ORAL DAILY
Qty: 90 TABLET | Refills: 1 | Status: SHIPPED | OUTPATIENT
Start: 2024-09-09

## 2024-09-09 NOTE — ASSESSMENT & PLAN NOTE
Last lipid panel was completed 4 months prior, therefore we will repeat again at your yearly visit.  A refill for your medication has been sent to the pharmacy.

## 2024-09-09 NOTE — ASSESSMENT & PLAN NOTE
Condition stable.  No changes to medications at this time.  A refill for your prescription has been sent to the pharmacy.

## 2024-09-09 NOTE — PROGRESS NOTES
Subjective   Patient ID: Blayne Covarrubias is a 58 y.o. male who presents for Hyperlipidemia and Insomnia.    Past Medical, Surgical, and Family History reviewed and updated in chart.    Reviewed all medications by prescribing practitioner or clinical pharmacist (such as prescriptions, OTCs, herbal therapies and supplements) and documented in the medical record.    HPI  Hx of alcohol abuse disorder  Blayne currently  is prescribed Antabuse 250 mg daily which he is tolerating well  He has been sober for 20+ years and is requesting a refill at this time    2. Insomnia   Symptoms are well controlled on Seroquel 25 mg daily.   He was initially prescribed the medication from a psychiatrist, but has continued it through this office.   Blayne tolerates the medication well and has restful sleep though accompanied with more vivid dreams.     3. Hyperlipidemia  Last lipid panel was completed on 4/2024 and was remarkable for:  --> Chol 95, LDL 46,  and Triglycerides 88  Currently on Crestor 10 mg daily and will repeat his lipid panel at this time.     CT coronary calcium score from 2023 remarkable for a total score of 191    FINDINGS:  The score and distribution of calcium in the coronary arteries is as  follows:  LM 0,  LAD 35.81,  LCx 45.42,  .9,  Total 191.14    Review of Systems  All pertinent positive symptoms are included in the history of present illness.    All other systems have been reviewed and are negative and noncontributory to this patient's current ailments.    Past Medical History:   Diagnosis Date    Acute prostatitis 03/08/2017    Acute bacterial prostatitis    Agatston coronary artery calcium score between 100 and 199 03/25/2024     (2023)    Alcohol dependence, uncomplicated (Multi) 01/17/2018    Alcoholism, chronic    Aneurysm of ascending aorta without rupture (CMS-HCC) 03/25/2024    Asc Ao 4.1 (2023)    Personal history of other specified conditions     History of insomnia    Personal history  of other specified conditions 11/12/2019    History of chronic cough    Unspecified abnormal findings in urine 03/08/2017    Malodorous urine    Urgency of urination 03/08/2017    Urinary urgency    Urinary tract infection, site not specified 03/08/2017    Bacterial UTI     Past Surgical History:   Procedure Laterality Date    CT GUIDED CHEST TUBE PLACEMENT  10/18/2018    CT GUIDED CHEST TUBE PLACEMENT [place] [facility] [place] [facility]    CT GUIDED PERCUTANEOUS BIOPSY LUNG  10/18/2018    CT GUIDED PERCUTANEOUS BIOPSY LUNG [place] [facility] [place] [facility]    OTHER SURGICAL HISTORY  10/23/2014    Prior Surgical Procedure Not Done    OTHER SURGICAL HISTORY  10/26/2018    Biopsy Lung Percutaneous    OTHER SURGICAL HISTORY  10/26/2018    Bronchoscopy (Diagnostic)    OTHER SURGICAL HISTORY  10/26/2018    Chest Tube Insertion    OTHER SURGICAL HISTORY  11/12/2018    Lung segmental resection     Social History     Tobacco Use    Smoking status: Never    Smokeless tobacco: Never   Substance Use Topics    Alcohol use: Not Currently    Drug use: Never     No family history on file.    Immunization History   Administered Date(s) Administered    Flu vaccine (IIV4), preservative free *Check age/dose* 10/09/2018    Flu vaccine, quadrivalent, no egg protein, age 6 month or greater (FLUCELVAX) 12/14/2021, 10/27/2022    Flu vaccine, trivalent, preservative free, age 6 months and greater (Fluarix/Fluzone/Flulaval) 09/20/2015, 09/25/2016    Influenza, injectable, quadrivalent 09/20/2019    Pfizer COVID-19 vaccine, bivalent, age 12 years and older (30 mcg/0.3 mL) 10/27/2022    Pfizer Purple Cap SARS-CoV-2 03/22/2021, 04/19/2021, 12/14/2021    Tdap vaccine, age 7 year and older (BOOSTRIX, ADACEL) 11/25/2015, 05/27/2021    Zoster vaccine, recombinant, adult (SHINGRIX) 12/30/2022, 04/25/2024     Current Outpatient Medications   Medication Instructions    disulfiram (ANTABUSE) 250 mg, oral, Daily    QUEtiapine (SEROQUEL) 25 mg, oral, Nightly     "rosuvastatin (CRESTOR) 10 mg, oral, Daily     No Known Allergies    Objective   Vitals:    09/09/24 0807   BP: 108/70   Pulse: 76   Weight: 102 kg (224 lb)   Height: 1.905 m (6' 3\")     Body mass index is 28 kg/m².    BP Readings from Last 3 Encounters:   09/09/24 108/70   04/25/24 120/72   03/25/24 112/71      Wt Readings from Last 3 Encounters:   09/09/24 102 kg (224 lb)   04/25/24 104 kg (230 lb)   03/25/24 106 kg (234 lb)      Physical Exam  CONSTITUTIONAL - well nourished, well developed, looks like stated age, in no acute distress, not ill-appearing, and not tired appearing  SKIN - normal skin color and pigmentation, normal skin turgor without rash, lesions, or nodules visualized  HEAD - no trauma, normocephalic  EYES - extraocular muscles are intact, and normal external exam  CHEST - clear to auscultation, no wheezing, no crackles and no rales, good effort  CARDIAC - regular rate and regular rhythm, no skipped beats, no murmur  EXTREMITIES - no obvious or evident edema, no obvious or evident deformities  NEUROLOGICAL -  alert, oriented and no focal signs  PSYCHIATRIC - alert, pleasant and cordial, age-appropriate    Assessment/Plan   Problem List Items Addressed This Visit       Alcohol abuse     Condition stable.  No changes to medications at this time.  A refill for your prescription has been sent to the pharmacy.         Relevant Medications    disulfiram (Antabuse) 250 mg tablet    Persistent insomnia     Condition stable.  No changes to medication at this time.  A refill for your prescription has been sent to the pharmacy.         Relevant Medications    QUEtiapine (SEROquel) 25 mg tablet    Mixed hyperlipidemia - Primary     Last lipid panel was completed 4 months prior, therefore we will repeat again at your yearly visit.  A refill for your medication has been sent to the pharmacy.         Relevant Medications    rosuvastatin (Crestor) 10 mg tablet     "

## 2024-09-09 NOTE — ASSESSMENT & PLAN NOTE
Condition stable.  No changes to medication at this time.  A refill for your prescription has been sent to the pharmacy.

## 2025-03-25 ENCOUNTER — APPOINTMENT (OUTPATIENT)
Dept: PRIMARY CARE | Facility: CLINIC | Age: 60
End: 2025-03-25
Payer: COMMERCIAL

## 2025-03-25 VITALS
BODY MASS INDEX: 28.35 KG/M2 | SYSTOLIC BLOOD PRESSURE: 122 MMHG | HEIGHT: 75 IN | DIASTOLIC BLOOD PRESSURE: 76 MMHG | WEIGHT: 228 LBS | HEART RATE: 70 BPM

## 2025-03-25 DIAGNOSIS — F10.10 ALCOHOL ABUSE: ICD-10-CM

## 2025-03-25 DIAGNOSIS — E78.2 MIXED HYPERLIPIDEMIA: Primary | ICD-10-CM

## 2025-03-25 DIAGNOSIS — G47.00 PERSISTENT INSOMNIA: ICD-10-CM

## 2025-03-25 PROCEDURE — 99214 OFFICE O/P EST MOD 30 MIN: CPT | Performed by: FAMILY MEDICINE

## 2025-03-25 PROCEDURE — 1036F TOBACCO NON-USER: CPT | Performed by: FAMILY MEDICINE

## 2025-03-25 PROCEDURE — 3008F BODY MASS INDEX DOCD: CPT | Performed by: FAMILY MEDICINE

## 2025-03-25 RX ORDER — QUETIAPINE FUMARATE 25 MG/1
25 TABLET, FILM COATED ORAL NIGHTLY
Qty: 90 TABLET | Refills: 1 | Status: SHIPPED | OUTPATIENT
Start: 2025-03-25 | End: 2025-03-25

## 2025-03-25 RX ORDER — QUETIAPINE FUMARATE 25 MG/1
25 TABLET, FILM COATED ORAL NIGHTLY
Qty: 90 TABLET | Refills: 1 | Status: SHIPPED | OUTPATIENT
Start: 2025-03-25 | End: 2025-09-21

## 2025-03-25 RX ORDER — DISULFIRAM 250 MG/1
250 TABLET ORAL DAILY
Qty: 90 TABLET | Refills: 1 | Status: SHIPPED | OUTPATIENT
Start: 2025-03-25 | End: 2025-03-25

## 2025-03-25 RX ORDER — ROSUVASTATIN CALCIUM 10 MG/1
10 TABLET, COATED ORAL DAILY
Qty: 90 TABLET | Refills: 1 | Status: SHIPPED | OUTPATIENT
Start: 2025-03-25 | End: 2025-03-25

## 2025-03-25 RX ORDER — DISULFIRAM 250 MG/1
250 TABLET ORAL DAILY
Qty: 90 TABLET | Refills: 1 | Status: SHIPPED | OUTPATIENT
Start: 2025-03-25 | End: 2025-09-21

## 2025-03-25 RX ORDER — ROSUVASTATIN CALCIUM 10 MG/1
10 TABLET, COATED ORAL DAILY
Qty: 90 TABLET | Refills: 1 | Status: SHIPPED | OUTPATIENT
Start: 2025-03-25

## 2025-03-25 ASSESSMENT — PATIENT HEALTH QUESTIONNAIRE - PHQ9
SUM OF ALL RESPONSES TO PHQ9 QUESTIONS 1 AND 2: 0
2. FEELING DOWN, DEPRESSED OR HOPELESS: NOT AT ALL
1. LITTLE INTEREST OR PLEASURE IN DOING THINGS: NOT AT ALL

## 2025-03-25 NOTE — PROGRESS NOTES
Chief Complaint  Patient ID: Blayne Covarrubias is a 59 y.o. male who presents for Insomnia and Hyperlipidemia.    Past Medical, Surgical, and Family History reviewed and updated in chart.    Reviewed all medications by prescribing practitioner or clinical pharmacist (such as prescriptions, OTCs, herbal therapies and supplements) and documented in the medical record.    History of Present Illness  1. History of Alcohol Use Disorder  Blayne is currently prescribed Antabuse 250 mg daily, which he is tolerating well. He has been sober for over 20 years and is requesting a refill at this time. He recently ran out of the medication for 30 days due to a supply issue but did not experience cravings or relapses. He would like a refill today.    2. Insomnia  Blayne's symptoms are well controlled on Seroquel 25 mg daily. He was initially prescribed this medication by a psychiatrist but has continued it through this office. Blayne tolerates the medication well and reports no side effects today.    3. Hyperlipidemia  A lipid panel is to be completed today. Blayne is currently taking Crestor 10 mg daily and will repeat his lipid panel at this time. He is fasting today and denies experiencing any side effects from Crestor. A CT coronary calcium score from 2023 was remarkable for a total score of 191.    Review of Systems  All pertinent positive symptoms are included in the history of present illness.    All other systems have been reviewed and are negative and noncontributory to this patient's current ailments.    Past Medical History  He has a past medical history of Acute prostatitis (03/08/2017), Agatston coronary artery calcium score between 100 and 199 (03/25/2024), Alcohol dependence, uncomplicated (Multi) (01/17/2018), Aneurysm of ascending aorta without rupture (CMS-HCC) (03/25/2024), Personal history of other specified conditions, Personal history of other specified conditions (11/12/2019), Unspecified abnormal findings in  urine (03/08/2017), Urgency of urination (03/08/2017), and Urinary tract infection, site not specified (03/08/2017).    Surgical History  He has a past surgical history that includes Other surgical history (10/23/2014); Other surgical history (10/26/2018); Other surgical history (10/26/2018); Other surgical history (10/26/2018); Other surgical history (11/12/2018); CT guided percutaneous biopsy lung (10/18/2018); and CT guided chest tube placement (10/18/2018).     Social History  He reports that he has never smoked. He has never used smokeless tobacco. He reports that he does not currently use alcohol. He reports that he does not use drugs.    No family history on file.  Outpatient Medications Prior to Visit   Medication Sig Dispense Refill    disulfiram (Antabuse) 250 mg tablet Take 1 tablet (250 mg) by mouth once daily. 90 tablet 1    QUEtiapine (SEROquel) 25 mg tablet Take 1 tablet (25 mg) by mouth once daily at bedtime. 90 tablet 1    rosuvastatin (Crestor) 10 mg tablet Take 1 tablet (10 mg) by mouth once daily. 90 tablet 1     No facility-administered medications prior to visit.     Allergies  Patient has no known allergies.    Immunization History   Administered Date(s) Administered    COVID-19, mRNA, LNP-S, PF, 30 mcg/0.3 mL dose 03/22/2021, 04/19/2021, 12/14/2021    DTaP vaccine, pediatric  (INFANRIX) 05/27/2021    Flu vaccine (IIV4), preservative free *Check age/dose* 10/09/2018    Flu vaccine, quadrivalent, no egg protein, age 6 month or greater (FLUCELVAX) 12/14/2021, 10/27/2022    Flu vaccine, trivalent, preservative free, age 6 months and greater (Fluarix/Fluzone/Flulaval) 09/20/2015, 09/25/2016    Flu vaccine, trivalent, preservative free, no egg protein, age 6 months or greater (Flucelvax) 10/08/2024    Influenza, injectable, quadrivalent 09/20/2019    Pfizer COVID-19 vaccine, 12 years and older, (30mcg/0.3mL) (Comirnaty) 10/08/2024    Pfizer COVID-19 vaccine, bivalent, age 12 years and older (30  "mcg/0.3 mL) 10/27/2022    Tdap vaccine, age 7 year and older (BOOSTRIX, ADACEL) 11/25/2015, 05/27/2021    Zoster vaccine, recombinant, adult (SHINGRIX) 12/30/2022, 04/25/2024     Objective   Visit Vitals  /76   Pulse 70   Ht 1.905 m (6' 3\")   Wt 103 kg (228 lb)   BMI 28.50 kg/m²   Smoking Status Never   BSA 2.33 m²        BP Readings from Last 3 Encounters:   03/25/25 122/76   09/09/24 108/70   04/25/24 120/72      Wt Readings from Last 3 Encounters:   03/25/25 103 kg (228 lb)   09/09/24 102 kg (224 lb)   04/25/24 104 kg (230 lb)     Relevant Results  No visits with results within 1 Month(s) from this visit.   Latest known visit with results is:   Lab on 04/25/2024   Component Date Value    Prostate Specific AG 04/25/2024 1.29     Cholesterol 04/25/2024 95     HDL-Cholesterol 04/25/2024 31.7     Cholesterol/HDL Ratio 04/25/2024 3.0     LDL Calculated 04/25/2024 46     VLDL 04/25/2024 18     Triglycerides 04/25/2024 88     Non HDL Cholesterol 04/25/2024 63     Thyroid Stimulating Horm* 04/25/2024 2.14     Glucose 04/25/2024 67 (L)     Sodium 04/25/2024 138     Potassium 04/25/2024 4.1     Chloride 04/25/2024 104     Bicarbonate 04/25/2024 26     Anion Gap 04/25/2024 12     Urea Nitrogen 04/25/2024 18     Creatinine 04/25/2024 1.20     eGFR 04/25/2024 70     Calcium 04/25/2024 8.9     Albumin 04/25/2024 4.3     Alkaline Phosphatase 04/25/2024 52     Total Protein 04/25/2024 6.9     AST 04/25/2024 19     Bilirubin, Total 04/25/2024 0.5     ALT 04/25/2024 24     WBC 04/25/2024 4.6     nRBC 04/25/2024 0.0     RBC 04/25/2024 5.30     Hemoglobin 04/25/2024 16.0     Hematocrit 04/25/2024 47.5     MCV 04/25/2024 90     MCH 04/25/2024 30.2     MCHC 04/25/2024 33.7     RDW 04/25/2024 12.1     Platelets 04/25/2024 224     Neutrophils % 04/25/2024 51.4     Immature Granulocytes %,* 04/25/2024 0.2     Lymphocytes % 04/25/2024 35.1     Monocytes % 04/25/2024 8.6     Eosinophils % 04/25/2024 4.1     Basophils % 04/25/2024 0.6 "     Neutrophils Absolute 04/25/2024 2.38     Immature Granulocytes Ab* 04/25/2024 0.01     Lymphocytes Absolute 04/25/2024 1.63     Monocytes Absolute 04/25/2024 0.40     Eosinophils Absolute 04/25/2024 0.19     Basophils Absolute 04/25/2024 0.03      Physical Exam  CONSTITUTIONAL - well nourished, well developed, looks like stated age, in no acute distress, not ill-appearing, and not tired appearing  SKIN - normal skin color and pigmentation, normal skin turgor without rash, lesions, or nodules visualized  HEAD - no trauma, normocephalic  EYES - pupils are equal and reactive to light, extraocular muscles are intact, and normal external exam  NECK - supple without rigidity, no neck mass was observed, no thyromegaly or thyroid nodules  CHEST - clear to auscultation, no wheezing, no crackles and no rales, good effort  CARDIAC - regular rate and regular rhythm, no skipped beats, no murmur  ABDOMEN - no organomegaly, soft, nontender, nondistended, normal bowel sounds, no guarding/rebound/rigidity  EXTREMITIES - no obvious or evident edema, no obvious or evident deformities  NEUROLOGICAL - alert, oriented and no focal signs  PSYCHIATRIC - alert, pleasant and cordial, age-appropriate  IMMUNOLOGIC - no cervical lymphadenopathy    Assessment and Plan  Problem List Items Addressed This Visit       Alcohol abuse     Condition stable.  No changes to medications at this time.  A refill for your prescription has been sent to the pharmacy.         Relevant Medications    disulfiram (Antabuse) 250 mg tablet    Other Relevant Orders    Comprehensive Metabolic Panel    Persistent insomnia     Condition stable.  No changes to medication at this time.  A refill for your prescription has been sent to the pharmacy.         Relevant Medications    QUEtiapine (SEROquel) 25 mg tablet    Mixed hyperlipidemia - Primary     Last lipid panel was completed 6 months prior, therefore we will repeat again at your yearly visit.  A refill for your  medication has been sent to the pharmacy.         Relevant Medications    rosuvastatin (Crestor) 10 mg tablet    Other Relevant Orders    Lipid Panel

## 2025-03-25 NOTE — ASSESSMENT & PLAN NOTE
Last lipid panel was completed 6 months prior, therefore we will repeat again at your yearly visit.  A refill for your medication has been sent to the pharmacy.

## 2025-03-26 ENCOUNTER — APPOINTMENT (OUTPATIENT)
Dept: CARDIOLOGY | Facility: CLINIC | Age: 60
End: 2025-03-26
Payer: COMMERCIAL

## 2025-03-26 LAB
ALBUMIN SERPL-MCNC: 4.9 G/DL (ref 3.6–5.1)
ALP SERPL-CCNC: 46 U/L (ref 35–144)
ALT SERPL-CCNC: 27 U/L (ref 9–46)
ANION GAP SERPL CALCULATED.4IONS-SCNC: 9 MMOL/L (CALC) (ref 7–17)
AST SERPL-CCNC: 16 U/L (ref 10–35)
BILIRUB SERPL-MCNC: 0.7 MG/DL (ref 0.2–1.2)
BUN SERPL-MCNC: 16 MG/DL (ref 7–25)
CALCIUM SERPL-MCNC: 10.1 MG/DL (ref 8.6–10.3)
CHLORIDE SERPL-SCNC: 103 MMOL/L (ref 98–110)
CHOLEST SERPL-MCNC: 115 MG/DL
CHOLEST/HDLC SERPL: 2.6 (CALC)
CO2 SERPL-SCNC: 29 MMOL/L (ref 20–32)
CREAT SERPL-MCNC: 1.23 MG/DL (ref 0.7–1.35)
EGFRCR SERPLBLD CKD-EPI 2021: 67 ML/MIN/1.73M2
GLUCOSE SERPL-MCNC: 94 MG/DL (ref 65–99)
HDLC SERPL-MCNC: 44 MG/DL
LDLC SERPL CALC-MCNC: 52 MG/DL (CALC)
NONHDLC SERPL-MCNC: 71 MG/DL (CALC)
POTASSIUM SERPL-SCNC: 4.2 MMOL/L (ref 3.5–5.3)
PROT SERPL-MCNC: 7.1 G/DL (ref 6.1–8.1)
SODIUM SERPL-SCNC: 141 MMOL/L (ref 135–146)
TRIGL SERPL-MCNC: 108 MG/DL

## 2025-04-30 DIAGNOSIS — R39.198 DECREASED URINE STREAM: Primary | ICD-10-CM

## 2025-05-14 ENCOUNTER — APPOINTMENT (OUTPATIENT)
Dept: UROLOGY | Facility: CLINIC | Age: 60
End: 2025-05-14
Payer: COMMERCIAL

## 2025-05-27 ENCOUNTER — APPOINTMENT (OUTPATIENT)
Dept: UROLOGY | Facility: CLINIC | Age: 60
End: 2025-05-27
Payer: COMMERCIAL

## 2025-05-27 VITALS — HEIGHT: 75 IN | TEMPERATURE: 97 F | BODY MASS INDEX: 28.5 KG/M2

## 2025-05-27 DIAGNOSIS — N40.1 BENIGN PROSTATIC HYPERPLASIA WITH URINARY RETENTION: ICD-10-CM

## 2025-05-27 DIAGNOSIS — R39.198 DECREASED URINE STREAM: Primary | ICD-10-CM

## 2025-05-27 DIAGNOSIS — R33.8 BENIGN PROSTATIC HYPERPLASIA WITH URINARY RETENTION: ICD-10-CM

## 2025-05-27 LAB
POC APPEARANCE, URINE: CLEAR
POC BILIRUBIN, URINE: NEGATIVE
POC BLOOD, URINE: ABNORMAL
POC COLOR, URINE: YELLOW
POC GLUCOSE, URINE: NEGATIVE MG/DL
POC KETONES, URINE: NEGATIVE MG/DL
POC LEUKOCYTES, URINE: ABNORMAL
POC NITRITE,URINE: POSITIVE
POC PH, URINE: 7 PH
POC PROTEIN, URINE: NEGATIVE MG/DL
POC SPECIFIC GRAVITY, URINE: 1.01
POC UROBILINOGEN, URINE: 0.2 EU/DL

## 2025-05-27 PROCEDURE — 81003 URINALYSIS AUTO W/O SCOPE: CPT | Performed by: UROLOGY

## 2025-05-27 PROCEDURE — 99204 OFFICE O/P NEW MOD 45 MIN: CPT | Performed by: UROLOGY

## 2025-05-27 PROCEDURE — 1036F TOBACCO NON-USER: CPT | Performed by: UROLOGY

## 2025-05-27 PROCEDURE — 51798 US URINE CAPACITY MEASURE: CPT | Performed by: UROLOGY

## 2025-05-27 PROCEDURE — 51741 ELECTRO-UROFLOWMETRY FIRST: CPT | Performed by: UROLOGY

## 2025-05-27 RX ORDER — TAMSULOSIN HYDROCHLORIDE 0.4 MG/1
0.4 CAPSULE ORAL DAILY
Qty: 30 CAPSULE | Refills: 11 | Status: SHIPPED | OUTPATIENT
Start: 2025-05-27 | End: 2026-05-27

## 2025-05-27 ASSESSMENT — PAIN SCALES - GENERAL: PAINLEVEL_OUTOF10: 0-NO PAIN

## 2025-05-27 NOTE — PROGRESS NOTES
Subjective   Blayne Covarrubias is a 59 y.o. male presenting as a new patient today, kindly referred by Dr. Mendosa due to bothersome LUTS. Patient is mostly bothered by weak stream and sensation of incomplete bladder emptying. He has occasional dysuria. Patient reports his urinary symptoms have been worsening over the last few years. Denies any recent gross hematuria, fevers, chills, urinary retention, intractable flank or abdominal pain, nausea or vomiting.          Medical History[1]  Surgical History[2]  Family History[3]  Current Medications[4]  Allergies[5]  Social History     Socioeconomic History    Marital status: Single     Spouse name: Not on file    Number of children: Not on file    Years of education: Not on file    Highest education level: Not on file   Occupational History    Not on file   Tobacco Use    Smoking status: Never    Smokeless tobacco: Never   Substance and Sexual Activity    Alcohol use: Not Currently    Drug use: Never    Sexual activity: Not on file   Other Topics Concern    Not on file   Social History Narrative    Not on file     Social Drivers of Health     Financial Resource Strain: Not on file   Food Insecurity: Not on file   Transportation Needs: Not on file   Physical Activity: Not on file   Stress: Not on file   Social Connections: Not on file   Intimate Partner Violence: Not on file   Housing Stability: Not on file       Review of Systems  Pertinent items are noted in HPI.    Objective       Lab Review  Lab Results   Component Value Date    WBC 4.6 04/25/2024    RBC 5.30 04/25/2024    HGB 16.0 04/25/2024    HCT 47.5 04/25/2024     04/25/2024      Lab Results   Component Value Date    BUN 16 03/25/2025    CREATININE 1.23 03/25/2025      Lab Results   Component Value Date    PSA 1.29 04/25/2024       IPSS: 20 and 3   PVR: 991 ml   Uroflow: volume voided 268 ml, Q max 10 ml/min, flattened curve.     Urine analysis shows +leukocytes, +nitrites, +blood     Assessment/Plan    Diagnoses and all orders for this visit:  Decreased urine stream  -     Referral to Urology  -     Measure post void residual  -     POCT UA Automated manually resulted  Benign prostatic hyperplasia with urinary retention  -     Urine flow measurement    BPH with LUTS - worsening   Large capacity bladder with significant retention     IO UA today is suspicious for a UTI.     I personally reviewed the patients labs; GFR is 67 (3/25/2025), stable.     We will initiate Tamsulosin 0.4 mg daily. Side effects of the medication were discussed with the patient including dizziness, drowsiness, weakness, nausea, diarrhea, headache, retrograde ejaculation, back pain, and runny nose. Patient understands risks and wishes to proceed.     We will obtain a STAT renal US and check urine culture to r/o a a UTI.     Follow up next week for repeat PVR.     All questions were answered to the patient's satisfaction. Patient agrees with the plan and wishes to proceed. Follow-up will be scheduled appropriately.       Scribed for Dr. Gaines by April Hardwick. I , Dr Gaines, have personally reviewed and agreed with the information entered by the Virtual Scribe.          [1]   Past Medical History:  Diagnosis Date    Acute prostatitis 03/08/2017    Acute bacterial prostatitis    Agatston coronary artery calcium score between 100 and 199 03/25/2024     (2023)    Alcohol dependence, uncomplicated (Multi) 01/17/2018    Alcoholism, chronic    Aneurysm of ascending aorta without rupture 03/25/2024    Asc Ao 4.1 (2023)    Personal history of other specified conditions     History of insomnia    Personal history of other specified conditions 11/12/2019    History of chronic cough    Unspecified abnormal findings in urine 03/08/2017    Malodorous urine    Urgency of urination 03/08/2017    Urinary urgency    Urinary tract infection, site not specified 03/08/2017    Bacterial UTI   [2]   Past Surgical History:  Procedure Laterality Date    CT  GUIDED CHEST TUBE PLACEMENT  10/18/2018    CT GUIDED CHEST TUBE PLACEMENT 10/18/2018 [facility]    CT GUIDED PERCUTANEOUS BIOPSY LUNG  10/18/2018    CT GUIDED PERCUTANEOUS BIOPSY LUNG 10/18/2018 [facility]    OTHER SURGICAL HISTORY  10/23/2014    Prior Surgical Procedure Not Done    OTHER SURGICAL HISTORY  10/26/2018    Biopsy Lung Percutaneous    OTHER SURGICAL HISTORY  10/26/2018    Bronchoscopy (Diagnostic)    OTHER SURGICAL HISTORY  10/26/2018    Chest Tube Insertion    OTHER SURGICAL HISTORY  11/12/2018    Lung segmental resection   [3] No family history on file.  [4]   Current Outpatient Medications   Medication Sig Dispense Refill    disulfiram (Antabuse) 250 mg tablet Take 1 tablet (250 mg) by mouth once daily. 90 tablet 1    QUEtiapine (SEROquel) 25 mg tablet Take 1 tablet (25 mg) by mouth once daily at bedtime. 90 tablet 1    rosuvastatin (Crestor) 10 mg tablet Take 1 tablet (10 mg) by mouth once daily. 90 tablet 1     No current facility-administered medications for this visit.   [5] No Known Allergies

## 2025-05-29 ENCOUNTER — HOSPITAL ENCOUNTER (OUTPATIENT)
Dept: RADIOLOGY | Facility: CLINIC | Age: 60
Discharge: HOME | End: 2025-05-29
Payer: COMMERCIAL

## 2025-05-29 DIAGNOSIS — R33.8 BENIGN PROSTATIC HYPERPLASIA WITH URINARY RETENTION: ICD-10-CM

## 2025-05-29 DIAGNOSIS — N40.1 BENIGN PROSTATIC HYPERPLASIA WITH URINARY RETENTION: ICD-10-CM

## 2025-05-29 LAB — BACTERIA UR CULT: NORMAL

## 2025-05-29 PROCEDURE — 76770 US EXAM ABDO BACK WALL COMP: CPT

## 2025-05-29 PROCEDURE — 76770 US EXAM ABDO BACK WALL COMP: CPT | Performed by: RADIOLOGY

## 2025-06-03 ENCOUNTER — APPOINTMENT (OUTPATIENT)
Dept: UROLOGY | Facility: CLINIC | Age: 60
End: 2025-06-03
Payer: COMMERCIAL

## 2025-06-03 VITALS — TEMPERATURE: 96.9 F | HEIGHT: 75 IN | BODY MASS INDEX: 28.5 KG/M2

## 2025-06-03 DIAGNOSIS — N40.1 BENIGN PROSTATIC HYPERPLASIA WITH URINARY RETENTION: Primary | ICD-10-CM

## 2025-06-03 DIAGNOSIS — R33.8 BENIGN PROSTATIC HYPERPLASIA WITH URINARY RETENTION: Primary | ICD-10-CM

## 2025-06-03 DIAGNOSIS — Z79.2 NEED FOR PROPHYLACTIC ANTIBIOTIC: ICD-10-CM

## 2025-06-03 PROCEDURE — 51798 US URINE CAPACITY MEASURE: CPT | Performed by: UROLOGY

## 2025-06-03 PROCEDURE — 1036F TOBACCO NON-USER: CPT | Performed by: UROLOGY

## 2025-06-03 PROCEDURE — 99214 OFFICE O/P EST MOD 30 MIN: CPT | Performed by: UROLOGY

## 2025-06-03 PROCEDURE — 51741 ELECTRO-UROFLOWMETRY FIRST: CPT | Performed by: UROLOGY

## 2025-06-03 RX ORDER — CEPHALEXIN 500 MG/1
500 CAPSULE ORAL ONCE
Qty: 1 CAPSULE | Refills: 0 | Status: SHIPPED | OUTPATIENT
Start: 2025-06-03 | End: 2025-06-03

## 2025-06-03 ASSESSMENT — PAIN SCALES - GENERAL: PAINLEVEL_OUTOF10: 0-NO PAIN

## 2025-06-03 NOTE — PROGRESS NOTES
Subjective   Blayne Covarrubias is a 59 y.o. male with history of BPH with LUTS and large capacity bladder with significant retention; presenting today for a follow up visit to recheck his PVR and review renal US. Patient was started on Tamsulosin 0.4 mg during his last visit. Patient reports no improvement in his urinary symptoms with Tamsulosin. Patient states his brother has history of urethral stricture.             LAST VISIT (5/29/2025):  Blayne Covarrubias is a 59 y.o. male presenting as a new patient today, kindly referred by Dr. Mendosa due to bothersome LUTS. Patient is mostly bothered by weak stream and sensation of incomplete bladder emptying. He has occasional dysuria. Patient reports his urinary symptoms have been worsening over the last few years. Denies any recent gross hematuria, fevers, chills, urinary retention, intractable flank or abdominal pain, nausea or vomiting.          Medical History[1]  Surgical History[2]  Family History[3]  Current Medications[4]  Allergies[5]  Social History     Socioeconomic History    Marital status: Single     Spouse name: Not on file    Number of children: Not on file    Years of education: Not on file    Highest education level: Not on file   Occupational History    Not on file   Tobacco Use    Smoking status: Never    Smokeless tobacco: Never   Substance and Sexual Activity    Alcohol use: Not Currently    Drug use: Never    Sexual activity: Not on file   Other Topics Concern    Not on file   Social History Narrative    Not on file     Social Drivers of Health     Financial Resource Strain: Not on file   Food Insecurity: Not on file   Transportation Needs: Not on file   Physical Activity: Not on file   Stress: Not on file   Social Connections: Not on file   Intimate Partner Violence: Not on file   Housing Stability: Not on file       Review of Systems  Pertinent items are noted in HPI.    Objective       Lab Review  Lab Results   Component Value Date    WBC 4.6 04/25/2024     RBC 5.30 04/25/2024    HGB 16.0 04/25/2024    HCT 47.5 04/25/2024     04/25/2024      Lab Results   Component Value Date    BUN 16 03/25/2025    CREATININE 1.23 03/25/2025      Lab Results   Component Value Date    PSA 1.29 04/25/2024       IPSS: 17 and 3   PVR: 1145 ml   Uroflow: volume voided 424 ml, Q max 11.7 ml/min, flat curve.           Assessment/Plan   Diagnoses and all orders for this visit:  Benign prostatic hyperplasia with urinary retention  -     Measure post void residual  -     Cystourethroscopy; Future  -     MR prostate with sonny boundaries if pirads 3 or above; Future  Need for prophylactic antibiotic      BPH with LUTS - worsening   Large capacity bladder with incomplete bladder emptying    PVR today is 1145 ml, no improvement since his last visit.     I personally and independently reviewed images of the renal US from 5/29/2025 which showed distended urinary bladder was very limited emptying of the urinary bladder on the postvoid examination. No hydronephrosis.      We will discontinue Tamsulosin.     We will We will obtain a prostate MRI to assess prostate anatomy in anticipation of fusion biopsy if indicated.    If negative we will proceed with cystoscopy.    The risks of cystoscopy were discussed with the patient in great detail, including risk of hematuria, UTI and discomfort. Antibiotic will be prescribed to be taken 1 hour prior to the procedure. Patient agrees to proceed.       All questions were answered to the patient's satisfaction. Patient agrees with the plan and wishes to proceed. Follow-up will be scheduled appropriately.       Scribed for Dr. Gaines by April Hardwick. I , Dr Gaines, have personally reviewed and agreed with the information entered by the Virtual Scribe.          [1]   Past Medical History:  Diagnosis Date    Acute prostatitis 03/08/2017    Acute bacterial prostatitis    Agatston coronary artery calcium score between 100 and 199 03/25/2024     (2023)     Alcohol dependence, uncomplicated (Multi) 01/17/2018    Alcoholism, chronic    Aneurysm of ascending aorta without rupture 03/25/2024    Asc Ao 4.1 (2023)    Personal history of other specified conditions     History of insomnia    Personal history of other specified conditions 11/12/2019    History of chronic cough    Unspecified abnormal findings in urine 03/08/2017    Malodorous urine    Urgency of urination 03/08/2017    Urinary urgency    Urinary tract infection, site not specified 03/08/2017    Bacterial UTI   [2]   Past Surgical History:  Procedure Laterality Date    CT GUIDED CHEST TUBE PLACEMENT  10/18/2018    CT GUIDED CHEST TUBE PLACEMENT 10/18/2018 [facility] [place] [facility]    CT GUIDED PERCUTANEOUS BIOPSY LUNG  10/18/2018    CT GUIDED PERCUTANEOUS BIOPSY LUNG 10/18/2018 [facility] [place] [facility]IB [facility]GAC[facility]    OTHER SURGICAL HISTORY  10/23/2014    Prior Surgical Procedure Not Done    OTHER SURGICAL HISTORY  10/26/2018    Biopsy Lung Percutaneous    OTHER SURGICAL HISTORY  10/26/2018    Bronchoscopy (Diagnostic)    OTHER SURGICAL HISTORY  10/26/2018    Chest Tube Insertion    OTHER SURGICAL HISTORY  11/12/2018    Lung segmental resection   [3] No family history on file.  [4]   Current Outpatient Medications   Medication Sig Dispense Refill    disulfiram (Antabuse) 250 mg tablet Take 1 tablet (250 mg) by mouth once daily. 90 tablet 1    QUEtiapine (SEROquel) 25 mg tablet Take 1 tablet (25 mg) by mouth once daily at bedtime. 90 tablet 1    rosuvastatin (Crestor) 10 mg tablet Take 1 tablet (10 mg) by mouth once daily. 90 tablet 1    tamsulosin (Flomax) 0.4 mg 24 hr capsule Take 1 capsule (0.4 mg) by mouth once daily. Do not crush, chew, or split. 30 capsule 11     No current facility-administered medications for this visit.   [5] No Known Allergies

## 2025-06-03 NOTE — PATIENT INSTRUCTIONS
Radiology [phone]  Take antibiotic 1 hour prior to cystoscopy.  We will need a urine sample during that appointment, so please arrive with a full enough bladder to leave a sample.  There are no restrictions in medications, eating/drinking, and driving.

## 2025-06-07 NOTE — PROGRESS NOTES
Subjective   Blayne Covarrubias is a 59 y.o. male with history of BPH with LUTS and large capacity bladder with significant retention; presenting today for a follow up visit to recheck his PVR and review renal US. Patient was started on Tamsulosin 0.4 mg during his last visit. Patient reports no improvement in his urinary symptoms with Tamsulosin. Patient states his brother has history of urethral stricture.           INITIAL VISIT (5/29/2025):  Blayne Covarrubias is a 59 y.o. male presenting as a new patient today, kindly referred by Dr. Mendosa due to bothersome LUTS. Patient is mostly bothered by weak stream and sensation of incomplete bladder emptying. He has occasional dysuria. Patient reports his urinary symptoms have been worsening over the last few years. Denies any recent gross hematuria, fevers, chills, urinary retention, intractable flank or abdominal pain, nausea or vomiting.          Medical History[1]  Surgical History[2]  Family History[3]  Current Medications[4]  Allergies[5]  Social History     Socioeconomic History    Marital status: Single     Spouse name: Not on file    Number of children: Not on file    Years of education: Not on file    Highest education level: Not on file   Occupational History    Not on file   Tobacco Use    Smoking status: Never    Smokeless tobacco: Never   Substance and Sexual Activity    Alcohol use: Not Currently    Drug use: Never    Sexual activity: Not on file   Other Topics Concern    Not on file   Social History Narrative    Not on file     Social Drivers of Health     Financial Resource Strain: Not on file   Food Insecurity: Not on file   Transportation Needs: Not on file   Physical Activity: Not on file   Stress: Not on file   Social Connections: Not on file   Intimate Partner Violence: Not on file   Housing Stability: Not on file       Review of Systems  Pertinent items are noted in HPI.    Objective     Cystoscopy performed:   Blayne Covarrubias identified using two (2) forms  of identification.  Procedure: diagnostic cystourethroscopy  Indications for procedure: ***  Risks, benefits, and alternatives were discussed in detail.   Patient appears to understand and agrees to proceed.   Patient has signed the procedure consent form.     Cystoscopy findings:  Urethra: normal course and caliber, no evidence of stricture or lesion.  Prostate: non-obstructing. BPH with LUTS   Bladder: normal capacity, no trabeculations, no diverticulum, no stone, tumors or other lesions.  Post-cystoscopy: Patient tolerated procedure without complications.    [] Lateral hypertrophy, with complete channel occlusion.  [] Obstructing median lobe with intravesical protrusion.  [] Good sphincter coaptation.  [] Normal bladder.       Lab Review  Lab Results   Component Value Date    WBC 4.6 04/25/2024    RBC 5.30 04/25/2024    HGB 16.0 04/25/2024    HCT 47.5 04/25/2024     04/25/2024      Lab Results   Component Value Date    BUN 16 03/25/2025    CREATININE 1.23 03/25/2025      Lab Results   Component Value Date    PSA 1.29 04/25/2024               Assessment/Plan   There are no diagnoses linked to this encounter.      BPH with LUTS - worsening   Large capacity bladder with incomplete bladder emptying    PVR today is 1145 ml, no improvement since his last visit.     I personally and independently reviewed images of the renal US from 5/29/2025 which showed distended urinary bladder was very limited emptying of the urinary bladder on the postvoid examination. No hydronephrosis.      We will discontinue Tamsulosin.         All questions were answered to the patient's satisfaction. Patient agrees with the plan and wishes to proceed. Follow-up will be scheduled appropriately.       Scribed for Dr. Gaines by April Hardwick. I , Dr Gaines, have personally reviewed and agreed with the information entered by the Virtual Scribe.          [1]   Past Medical History:  Diagnosis Date    Acute prostatitis 03/08/2017    Acute  bacterial prostatitis    Agatston coronary artery calcium score between 100 and 199 03/25/2024     (2023)    Alcohol dependence, uncomplicated (Multi) 01/17/2018    Alcoholism, chronic    Aneurysm of ascending aorta without rupture 03/25/2024    Asc Ao 4.1 (2023)    Personal history of other specified conditions     History of insomnia    Personal history of other specified conditions 11/12/2019    History of chronic cough    Unspecified abnormal findings in urine 03/08/2017    Malodorous urine    Urgency of urination 03/08/2017    Urinary urgency    Urinary tract infection, site not specified 03/08/2017    Bacterial UTI   [2]   Past Surgical History:  Procedure Laterality Date    CT GUIDED CHEST TUBE PLACEMENT  10/18/2018    CT GUIDED CHEST TUBE PLACEMENT 10/18/2018 [facility] AIB LEGACY    CT GUIDED PERCUTANEOUS BIOPSY LUNG  10/18/2018    CT GUIDED PERCUTANEOUS BIOPSY LUNG 10[place]18/2018 [facility] [place] [facility]    OTHER SURGICAL HISTORY  10/23/2014    Prior Surgical Procedure Not Done    OTHER SURGICAL HISTORY  10/26/2018    Biopsy Lung Percutaneous    OTHER SURGICAL HISTORY  10/26/2018    Bronchoscopy (Diagnostic)    OTHER SURGICAL HISTORY  10/26/2018    Chest Tube Insertion    OTHER SURGICAL HISTORY  11/12/2018    Lung segmental resection   [3] No family history on file.  [4]   Current Outpatient Medications   Medication Sig Dispense Refill    disulfiram (Antabuse) 250 mg tablet Take 1 tablet (250 mg) by mouth once daily. 90 tablet 1    QUEtiapine (SEROquel) 25 mg tablet Take 1 tablet (25 mg) by mouth once daily at bedtime. 90 tablet 1    rosuvastatin (Crestor) 10 mg tablet Take 1 tablet (10 mg) by mouth once daily. 90 tablet 1    tamsulosin (Flomax) 0.4 mg 24 hr capsule Take 1 capsule (0.4 mg) by mouth once daily. Do not crush, chew, or split. 30 capsule 11     No current facility-administered medications for this visit.   [5] No Known Allergies

## 2025-06-10 ENCOUNTER — APPOINTMENT (OUTPATIENT)
Dept: UROLOGY | Facility: CLINIC | Age: 60
End: 2025-06-10
Payer: COMMERCIAL

## 2025-06-18 ENCOUNTER — HOSPITAL ENCOUNTER (OUTPATIENT)
Dept: RADIOLOGY | Facility: HOSPITAL | Age: 60
Discharge: HOME | End: 2025-06-18
Payer: COMMERCIAL

## 2025-06-18 VITALS — BODY MASS INDEX: 28.38 KG/M2 | WEIGHT: 227.07 LBS

## 2025-06-18 DIAGNOSIS — N40.1 BENIGN PROSTATIC HYPERPLASIA WITH URINARY RETENTION: ICD-10-CM

## 2025-06-18 DIAGNOSIS — R33.8 BENIGN PROSTATIC HYPERPLASIA WITH URINARY RETENTION: ICD-10-CM

## 2025-06-18 PROCEDURE — A9575 INJ GADOTERATE MEGLUMI 0.1ML: HCPCS | Performed by: UROLOGY

## 2025-06-18 PROCEDURE — 72197 MRI PELVIS W/O & W/DYE: CPT

## 2025-06-18 PROCEDURE — 2550000001 HC RX 255 CONTRASTS: Performed by: UROLOGY

## 2025-06-18 PROCEDURE — 72195 MRI PELVIS W/O DYE: CPT

## 2025-06-18 RX ORDER — GADOTERATE MEGLUMINE 376.9 MG/ML
20 INJECTION INTRAVENOUS
Status: COMPLETED | OUTPATIENT
Start: 2025-06-18 | End: 2025-06-18

## 2025-06-18 RX ADMIN — GADOTERATE MEGLUMINE 20 ML: 376.9 INJECTION INTRAVENOUS at 14:17

## 2025-06-24 ENCOUNTER — APPOINTMENT (OUTPATIENT)
Dept: UROLOGY | Facility: CLINIC | Age: 60
End: 2025-06-24
Payer: COMMERCIAL

## 2025-06-24 VITALS
TEMPERATURE: 97.3 F | HEART RATE: 74 BPM | BODY MASS INDEX: 28.72 KG/M2 | DIASTOLIC BLOOD PRESSURE: 78 MMHG | HEIGHT: 75 IN | WEIGHT: 231 LBS | SYSTOLIC BLOOD PRESSURE: 144 MMHG

## 2025-06-24 DIAGNOSIS — R33.8 BENIGN PROSTATIC HYPERPLASIA WITH URINARY RETENTION: Primary | ICD-10-CM

## 2025-06-24 DIAGNOSIS — N40.1 BENIGN PROSTATIC HYPERPLASIA WITH URINARY RETENTION: Primary | ICD-10-CM

## 2025-06-24 DIAGNOSIS — Z79.2 NEED FOR PROPHYLACTIC ANTIBIOTIC: ICD-10-CM

## 2025-06-24 DIAGNOSIS — Z01.812 PRE-OPERATIVE LABORATORY EXAMINATION: ICD-10-CM

## 2025-06-24 LAB
POC APPEARANCE, URINE: ABNORMAL
POC BILIRUBIN, URINE: NEGATIVE
POC BLOOD, URINE: ABNORMAL
POC COLOR, URINE: ABNORMAL
POC GLUCOSE, URINE: NEGATIVE MG/DL
POC KETONES, URINE: NEGATIVE MG/DL
POC LEUKOCYTES, URINE: ABNORMAL
POC NITRITE,URINE: POSITIVE
POC PH, URINE: 7 PH
POC PROTEIN, URINE: NEGATIVE MG/DL
POC SPECIFIC GRAVITY, URINE: <=1.005
POC UROBILINOGEN, URINE: 0.2 EU/DL

## 2025-06-24 PROCEDURE — 99215 OFFICE O/P EST HI 40 MIN: CPT | Performed by: UROLOGY

## 2025-06-24 PROCEDURE — 81003 URINALYSIS AUTO W/O SCOPE: CPT | Performed by: UROLOGY

## 2025-06-24 PROCEDURE — 52000 CYSTOURETHROSCOPY: CPT | Performed by: UROLOGY

## 2025-06-24 RX ORDER — SODIUM CHLORIDE, SODIUM LACTATE, POTASSIUM CHLORIDE, CALCIUM CHLORIDE 600; 310; 30; 20 MG/100ML; MG/100ML; MG/100ML; MG/100ML
20 INJECTION, SOLUTION INTRAVENOUS CONTINUOUS
OUTPATIENT
Start: 2025-06-24 | End: 2025-06-25

## 2025-06-24 RX ORDER — CEFAZOLIN SODIUM 2 G/100ML
2 INJECTION, SOLUTION INTRAVENOUS ONCE
OUTPATIENT
Start: 2025-06-24 | End: 2025-06-24

## 2025-06-24 RX ORDER — SULFAMETHOXAZOLE AND TRIMETHOPRIM 800; 160 MG/1; MG/1
1 TABLET ORAL 2 TIMES DAILY
Qty: 6 TABLET | Refills: 0 | Status: SHIPPED | OUTPATIENT
Start: 2025-06-24 | End: 2025-06-27

## 2025-06-24 ASSESSMENT — PAIN SCALES - GENERAL: PAINLEVEL_OUTOF10: 0-NO PAIN

## 2025-06-24 NOTE — PROGRESS NOTES
Subjective   Blayne Covarrubias is a 59 y.o. male with history of BPH with LUTS and large capacity bladder with significant retention; presenting today for cystoscopy in anticipation of an outlet procedure.           INITIAL VISIT (5/29/2025):  Blayne Covarrubias is a 59 y.o. male presenting as a new patient today, kindly referred by Dr. Mendosa due to bothersome LUTS. Patient is mostly bothered by weak stream and sensation of incomplete bladder emptying. He has occasional dysuria. Patient reports his urinary symptoms have been worsening over the last few years. Denies any recent gross hematuria, fevers, chills, urinary retention, intractable flank or abdominal pain, nausea or vomiting.          Medical History[1]  Surgical History[2]  Family History[3]  Current Medications[4]  Allergies[5]  Social History     Socioeconomic History    Marital status: Single     Spouse name: Not on file    Number of children: Not on file    Years of education: Not on file    Highest education level: Not on file   Occupational History    Not on file   Tobacco Use    Smoking status: Never    Smokeless tobacco: Never   Substance and Sexual Activity    Alcohol use: Not Currently    Drug use: Never    Sexual activity: Not on file   Other Topics Concern    Not on file   Social History Narrative    Not on file     Social Drivers of Health     Financial Resource Strain: Not on file   Food Insecurity: Not on file   Transportation Needs: Not on file   Physical Activity: Not on file   Stress: Not on file   Social Connections: Not on file   Intimate Partner Violence: Not on file   Housing Stability: Not on file       Review of Systems  Pertinent items are noted in HPI.    Objective     Cystoscopy performed:   Blayne Covarrubias identified using two (2) forms of identification.  Procedure: diagnostic cystourethroscopy  Indications for procedure:  BPH with LUTS   Risks, benefits, and alternatives were discussed in detail.   Patient appears to understand and  agrees to proceed.   Patient has signed the procedure consent form.     Cystoscopy findings:  Urethra: normal course and caliber, no evidence of stricture or lesion.  Prostate: non-obstructing.  Bladder: large capacity, distended with trabeculations. No diverticulum, no stone, tumors or other lesions.  Post-cystoscopy: Patient tolerated procedure without complications.    [x] Lateral hypertrophy, with complete channel occlusion.  [] Obstructing median lobe with intravesical protrusion.  [x] Good sphincter coaptation.  [] Normal bladder.       Lab Review  Lab Results   Component Value Date    WBC 4.6 04/25/2024    RBC 5.30 04/25/2024    HGB 16.0 04/25/2024    HCT 47.5 04/25/2024     04/25/2024      Lab Results   Component Value Date    BUN 16 03/25/2025    CREATININE 1.23 03/25/2025      Lab Results   Component Value Date    PSA 1.29 04/25/2024               Assessment/Plan   Diagnoses and all orders for this visit:  Benign prostatic hyperplasia with urinary retention  -     POCT UA Automated manually resulted        BPH with LUTS - worsening   Large capacity bladder with incomplete bladder emptying    I personally and independently reviewed images of the prostate MRI from 6/18/2025 which showed a 15.4 g prostate with no evidence of cancer.    We will proceed with GreenLight laser PVP.     I discussed in detail the risks associated with the GreenLight laser PVP procedure, which include small risk of urinary incontinence, risk of erectile dysfunction, 60% risk of retrograde ejaculation, and additional risk of hematuria, UTI, and failure to improve urinary symptoms.     All questions were answered to the patient's satisfaction. Patient agrees with the plan and wishes to proceed. Follow-up will be scheduled appropriately.     I spent 40 minutes of dedicated E&M time, including preparation and review of records, notes, and data, time spent with patient/family, and documentation.       Scribed for Dr. Gaines by  April Pena I , Dr Gaines, have personally reviewed and agreed with the information entered by the Virtual Scribe.          [1]   Past Medical History:  Diagnosis Date    Acute prostatitis 03/08/2017    Acute bacterial prostatitis    Agatston coronary artery calcium score between 100 and 199 03/25/2024     (2023)    Alcohol dependence, uncomplicated (Multi) 01/17/2018    Alcoholism, chronic    Aneurysm of ascending aorta without rupture 03/25/2024    Asc Ao 4.1 (2023)    Personal history of other specified conditions     History of insomnia    Personal history of other specified conditions 11/12/2019    History of chronic cough    Unspecified abnormal findings in urine 03/08/2017    Malodorous urine    Urgency of urination 03/08/2017    Urinary urgency    Urinary tract infection, site not specified 03/08/2017    Bacterial UTI   [2]   Past Surgical History:  Procedure Laterality Date    CT GUIDED CHEST TUBE PLACEMENT  10/18/2018    CT GUIDED CHEST TUBE PLACEMENT 10/18/2018 [facility] [place] [facility]    CT GUIDED PERCUTANEOUS BIOPSY LUNG  10/18/2018    CT GUIDED PERCUTANEOUS BIOPSY LUNG 10/18/2018 [facility] [place] [facility]    OTHER SURGICAL HISTORY  10/23/2014    Prior Surgical Procedure Not Done    OTHER SURGICAL HISTORY  10/26/2018    Biopsy Lung Percutaneous    OTHER SURGICAL HISTORY  10/26/2018    Bronchoscopy (Diagnostic)    OTHER SURGICAL HISTORY  10/26/2018    Chest Tube Insertion    OTHER SURGICAL HISTORY  11/12/2018    Lung segmental resection   [3] No family history on file.  [4]   Current Outpatient Medications   Medication Sig Dispense Refill    disulfiram (Antabuse) 250 mg tablet Take 1 tablet (250 mg) by mouth once daily. 90 tablet 1    QUEtiapine (SEROquel) 25 mg tablet Take 1 tablet (25 mg) by mouth once daily at bedtime. 90 tablet 1    rosuvastatin (Crestor) 10 mg tablet Take 1 tablet (10 mg) by mouth once daily. 90 tablet 1    tamsulosin (Flomax) 0.4 mg 24 hr capsule Take 1 capsule (0.4 mg) by mouth once daily. Do  not crush, chew, or split. 30 capsule 11     No current facility-administered medications for this visit.   [5] No Known Allergies

## 2025-06-26 LAB — BACTERIA UR CULT: NORMAL

## 2025-07-10 ENCOUNTER — PRE-ADMISSION TESTING (OUTPATIENT)
Dept: PREADMISSION TESTING | Facility: HOSPITAL | Age: 60
End: 2025-07-10
Payer: COMMERCIAL

## 2025-07-10 VITALS
HEART RATE: 74 BPM | RESPIRATION RATE: 14 BRPM | BODY MASS INDEX: 28.81 KG/M2 | OXYGEN SATURATION: 97 % | DIASTOLIC BLOOD PRESSURE: 70 MMHG | WEIGHT: 231.7 LBS | HEIGHT: 75 IN | SYSTOLIC BLOOD PRESSURE: 105 MMHG | TEMPERATURE: 98.1 F

## 2025-07-10 DIAGNOSIS — Z01.818 PREOP TESTING: Primary | ICD-10-CM

## 2025-07-10 PROCEDURE — 99204 OFFICE O/P NEW MOD 45 MIN: CPT | Performed by: NURSE PRACTITIONER

## 2025-07-10 PROCEDURE — 93010 ELECTROCARDIOGRAM REPORT: CPT | Performed by: INTERNAL MEDICINE

## 2025-07-10 PROCEDURE — 93005 ELECTROCARDIOGRAM TRACING: CPT

## 2025-07-10 RX ORDER — BISMUTH SUBSALICYLATE 262 MG
1 TABLET,CHEWABLE ORAL DAILY
COMMUNITY

## 2025-07-10 ASSESSMENT — DUKE ACTIVITY SCORE INDEX (DASI)
CAN YOU WALK INDOORS, SUCH AS AROUND YOUR HOUSE: YES
CAN YOU PARTICIPATE IN MODERATE RECREATIONAL ACTIVITIES LIKE GOLF, BOWLING, DANCING, DOUBLES TENNIS OR THROWING A BASEBALL OR FOOTBALL: YES
TOTAL_SCORE: 50.7
CAN YOU WALK A BLOCK OR TWO ON LEVEL GROUND: YES
CAN YOU DO YARD WORK LIKE RAKING LEAVES, WEEDING OR PUSHING A MOWER: YES
CAN YOU RUN A SHORT DISTANCE: YES
CAN YOU PARTICIPATE IN STRENOUS SPORTS LIKE SWIMMING, SINGLES TENNIS, FOOTBALL, BASKETBALL, OR SKIING: NO
CAN YOU TAKE CARE OF YOURSELF (EAT, DRESS, BATHE, OR USE TOILET): YES
CAN YOU DO MODERATE WORK AROUND THE HOUSE LIKE VACUUMING, SWEEPING FLOORS OR CARRYING GROCERIES: YES
DASI METS SCORE: 9
CAN YOU HAVE SEXUAL RELATIONS: YES
CAN YOU DO HEAVY WORK AROUND THE HOUSE LIKE SCRUBBING FLOORS OR LIFTING AND MOVING HEAVY FURNITURE: YES
CAN YOU DO LIGHT WORK AROUND THE HOUSE LIKE DUSTING OR WASHING DISHES: YES
CAN YOU CLIMB A FLIGHT OF STAIRS OR WALK UP A HILL: YES

## 2025-07-10 ASSESSMENT — PAIN SCALES - GENERAL: PAINLEVEL_OUTOF10: 0 - NO PAIN

## 2025-07-10 ASSESSMENT — PAIN - FUNCTIONAL ASSESSMENT: PAIN_FUNCTIONAL_ASSESSMENT: 0-10

## 2025-07-10 NOTE — PREPROCEDURE INSTRUCTIONS
Medication List            Accurate as of July 10, 2025  1:30 PM. Always use your most recent med list.                disulfiram 250 mg tablet  Commonly known as: Antabuse  Take 1 tablet (250 mg) by mouth once daily.  Medication Adjustments for Surgery: Take/Use as prescribed     multivitamin tablet  Additional Medication Adjustments for Surgery: Take last dose 7 days before surgery  Notes to patient: last dose preoperatively on 7/17/25     QUEtiapine 25 mg tablet  Commonly known as: SEROquel  Take 1 tablet (25 mg) by mouth once daily at bedtime.  Medication Adjustments for Surgery: Take/Use as prescribed     rosuvastatin 10 mg tablet  Commonly known as: Crestor  Take 1 tablet (10 mg) by mouth once daily.  Medication Adjustments for Surgery: Take/Use as prescribed     tamsulosin 0.4 mg 24 hr capsule  Commonly known as: Flomax  Take 1 capsule (0.4 mg) by mouth once daily. Do not crush, chew, or split.  Medication Adjustments for Surgery: Take/Use as prescribed            NPO Instructions:     Do not eat any food after midnight the night before your surgery/procedure.  You may have clear liquids until TWO hours before surgery/procedure. This includes water, black tea/coffee, (no milk or cream) apple juice and electrolyte drinks (Gatorade).  You may chew gum up to TWO hours before your surgery/procedure.     Additional Instructions:      Seven/Six Days before Surgery:  Review your medication instructions, stop indicated medications  Five Days before Surgery:  Review your medication instructions, stop indicated medications  Three Days before Surgery:  Review your medication instructions, stop indicated medications  The Day before Surgery:  No smoking or alcohol use 24 hours before surgery  Review your medication instructions, stop indicated medications  You will be contacted regarding the time of your arrival to facility and surgery time  Do not eat any food after Midnight  Day of Surgery:  Review your medication  instructions, take indicated medications  If you have diabetes, please check your fasting blood sugar upon awakening.  If fasting blood sugar is <80 mg/dl, drink 100 ml of apple juice, time limit of 2 hours before  You may have clear liquids until TWO hours before surgery/procedure.  This includes water, black tea/coffee, (no milk or cream) apple juice and electrolyte drinks (Gatorade)  You may chew gum up to TWO hours before your surgery/procedure  Wear  comfortable loose fitting clothing  Do not use moisturizers, creams, lotions or perfume  All jewelry and valuables should be left at home     CONTACT SURGEON'S OFFICE IF YOU DEVELOP:  * Fever = 100.4 F   * New respiratory symptoms (e.g. cough, shortness of breath, respiratory distress, sore throat)  * Recent loss of taste or smell  *Flu like symptoms such as headache, fatigue or gastrointestinal symptoms  * You develop any open sores, shingles, burning or painful urination   AND/OR:  * You no longer wish to have the surgery.  * Any other personal circumstances change that may lead to the need to cancel or defer this surgery.  *You were admitted to any hospital within one week of your planned procedure.     SMOKING:  *Quitting smoking can make a huge difference to your health and recovery from surgery.    *If you need help with quitting, call 5-800QUIT-NOW.     THE DAY BEFORE SURGERY:  *Do not eat any food after midnight the night before your surgery.   *You may have up to TEN OUNCES of clear liquids until TWO hours before your instructed ARRIVAL TIME to hospital. This includes water, black tea/coffee, (no milk or cream) apple juice, clear broth and electrolyte drinks (Gatorade). Please avoid clear liquids that are red in color.   *You may chew gum/mints up to TWO hours before your surgery/procedure.     SURGICAL TIME:  *You will be contacted between 2 p.m. and 3 p.m. the business day before your surgery with your arrival time.  *If you haven't received a call by  3pm, call [phone]  *Scheduled surgery times may change and you will be notified if this occurs-check your personal voicemail for any updates.     ON THE MORNING OF SURGERY:  *Wear comfortable, loose fitting clothing.   *Do not use moisturizers, creams, lotions or perfume.  *All jewelry and valuables should be left at home.  *Prosthetic devices such as contact lenses, hearing aids, dentures, eyelash extensions, hairpins and body piercing must be removed before surgery.     BRING WITH YOU:  *Photo ID and insurance card  *Current list of medications and allergies  *Pacemaker/Defibrillator/Heart stent cards  *CPAP machine and mask  *Slings/splints/crutches  *Copy of your complete Advanced Directive/DHPOA-if applicable  *Neurostimulator implant remote     PARKING AND ARRIVAL:  *Check in at the Main Entrance desk and let them know you are here for surgery.     IF YOU ARE HAVING OUTPATIENT/SAME DAY SURGERY:  *A responsible adult MUST accompany you at the time of discharge and stay with you for 24 hours after your surgery.  *You may NOT drive yourself home after surgery.  *You may use a taxi or ride sharing service ([employer], Uber) to return home ONLY if you are accompanied by a friend or family member.  *Instructions for resuming your medications will be provided by your surgeon.     Thank you for coming to Pre Admission testing.      If I have prescribed medication please don't forget to  at your pharmacy.      Any questions about today's visit call [phone] and leave a message in the general mailbox.     Patient instructed to ambulate as soon as possible postoperatively to decrease thromboembolic risk.     Thank you for visiting [facility].  If you have any changes to your health condition, please call the surgeons office to alert them and give them details of your symptoms.        Preoperative Fasting Guidelines     Why must I stop eating and drinking near surgery time?  With  sedation, food or liquid in your stomach can enter your lungs causing serious complications  Increases nausea and vomiting     When do I need to stop eating and drinking before my surgery?  Do not eat any food after midnight the night before your surgery/procedure.  You may have up to TEN ounces of clear liquid until TWO hours before your instructed arrival time to the hospital.  This includes water, black tea/coffee, (no milk or cream) apple juice, and electrolyte drinks (Gatorade)  You may chew gum until TWO hours before your surgery/procedure        Additional Instructions:      The Day before Surgery:  -Review your medication instructions, stop indicated medications  -You will be contacted in the evening regarding the time of your arrival to facility and surgery time     Day of Surgery:  -Review your medication instructions, take indicated medications  -Wear comfortable loose fitting clothing  -Do not use moisturizers, creams, lotions or perfume  -All jewelry and valuables should be left at home                   Preoperative Brain Exercises     What are brain exercises?  A brain exercise is any activity that engages your thinking (cognitive) skills.     What types of activities are considered brain exercises?  Jigsaw puzzles, crossword puzzles, word jumble, memory games, word search, and many more.  Many can be found free online or on your phone via a mobile luke.     Why should I do brain exercises before my surgery?  More recent research has shown brain exercise before surgery can lower the risk of postoperative delirium (confusion) which can be especially important for older adults.  Patients who did brain exercises for 5 to 10 minutes/day in the days before surgery, cut their risk of postoperative delirium in half up to 1 week after surgery.                         [facility]     Preoperative Deep Breathing Exercises     Why it is important to do deep breathing exercises before my  surgery?  Deep breathing exercises strengthen your breathing muscles.  This helps you to recover after your surgery and decreases the chance of breathing complications.        How are the deep breathing exercises done?  Sit straight with your back supported.  Breathe in deeply and slowly through your nose. Your lower rib cage should expand and your abdomen may move forward.  Hold that breath for 3 to 5 seconds.  Breathe out through pursed lips, slowly and completely.  Rest and repeat 10 times every hour while awake.  Rest longer if you become dizzy or lightheaded.                      [facility]     Preoperative Deep Breathing Exercises     Why it is important to do deep breathing exercises before my surgery?  Deep breathing exercises strengthen your breathing muscles.  This helps you to recover after your surgery and decreases the chance of breathing complications.        How are the deep breathing exercises done?  Sit straight with your back supported.  Breathe in deeply and slowly through your nose. Your lower rib cage should expand and your abdomen may move forward.  Hold that breath for 3 to 5 seconds.  Breathe out through pursed lips, slowly and completely.  Rest and repeat 10 times every hour while awake.  Rest longer if you become dizzy or lightheaded.        Patient Information: Incentive Spirometer  What is an incentive spirometer?  An incentive spirometer is a device used before and after surgery to “exercise” your lungs.  It helps you to take deeper breaths to expand your lungs.  Below is an example of a basic incentive spirometer.  The device you receive may differ slightly but they all function the same.    Why do I need to use an incentive spirometer?  Using your incentive spirometer prepares your lungs for surgery and helps prevent lung problems after surgery.  How do I use my incentive spirometer?  When you're using your incentive spirometer, make sure to breathe through  your mouth. If you breathe through your nose, the incentive spirometer won't work properly. You can hold your nose if you have trouble.  If you feel dizzy at any time, stop and rest. Try again at a later time.  Follow the steps below:  Set up your incentive spirometer, expand the flexible tubing and connect to the outlet.  Sit upright in a chair or bed. Hold the incentive spirometer at eye level.   Put the mouthpiece in your mouth and close your lips tightly around it. Slowly breathe out (exhale) completely.  Breathe in (inhale) slowly through your mouth as deeply as you can. As you take a breath, you will see the piston rise inside the large column. While the piston rises, the indicator should move upwards. It should stay in between the 2 arrows (see Figure).  Try to get the piston as high as you can, while keeping the indicator between the arrows.   If the indicator doesn't stay between the arrows, you're breathing either too fast or too slow.  When you get it as high as you can, hold your breath for 10 seconds, or as long as possible. While you're holding your breath, the piston will slowly fall to the base of the spirometer.  Once the piston reaches the bottom of the spirometer, breathe out slowly through your mouth. Rest for a few seconds.  Repeat 10 times. Try to get the piston to the same level with each breath.  Repeat every hour while awake  You can carefully clean the outside of the mouthpiece with an alcohol wipe or soap and water.       Patient and Family Education             Ways You Can Help Prevent Blood Clots                    This handout explains some simple things you can do to help prevent blood clots.      Blood clots are blockages that can form in the body's veins. When a blood clot forms in your deep veins, it may be called a deep vein thrombosis, or DVT for short. Blood clots can happen in any part of the body where blood flows, but they are most common in the arms and legs. If a piece of a  blood clot breaks free and travels to the lungs, it is called a pulmonary embolus (PE). A PE can be a very serious problem.         Being in the hospital or having surgery can raise your chances of getting a blood clot because you may not be well enough to move around as much as you normally do.         Ways you can help prevent blood clots in the hospital           Wearing SCDs. SCDs stands for Sequential Compression Devices.   SCDs are special sleeves that wrap around your legs  They attach to a pump that fills them with air to gently squeeze your legs every few minutes.   This helps return the blood in your legs to your heart.   SCDs should only be taken off when walking or bathing.   SCDs may not be comfortable, but they can help save your life.                                            Wearing compression stockings - if your doctor orders them. These special snug fitting stockings gently squeeze your legs to help blood flow.       Walking. Walking helps move the blood in your legs.   If your doctor says it is ok, try walking the halls at least   5 times a day. Ask us to help you get up, so you don't fall.      Taking any blood thinning medicines your doctor orders.        Page 1 of 2            [facility]; 3/23   Ways you can help prevent blood clots at home         Wearing compression stockings - if your doctor orders them. ? Walking - to help move the blood in your legs.       Taking any blood thinning medicines your doctor orders.      Signs of a blood clot or PE        Tell your doctor or nurse know right away if you have of the problems listed below.    If you are at home, seek medical care right away. Call 911 for chest pain or problems breathing.                Signs of a blood clot (DVT) - such as pain,  swelling, redness or warmth in your arm or leg      Signs of a pulmonary embolism (PE) - such as chest pain or feeling short of breath      Preoperative Clearances  -If you are informed by  the preadmission testing team that you need clearance for your surgery, please reach out to the provider in question to assisting accommodating obtaining your clearance.   -Please have you provider fax your clearance letter to [phone] if needed.   -A blank clearance letter will be provided to you if indicated.     Anticoagulation  -If you are on oral anticoagulation such as Coumadin, Eliquis, Xarelto, Plavix, Brilinta, Pradaxa; we will need to obtain preoperative anticoagulation instructions from the prescribing provider.   -We will reach out to the prescriber but do encourage you to call their office as well as it will increase the chances of getting the necessary information.   -We will contact you with the instruction once we obtain them.

## 2025-07-10 NOTE — CPM/PAT H&P
"[facility]/PAT Evaluation       Name: Blayne Covarrubias (Blayne Covarrubias \"Kojo\")  /Age: 1965/59 y.o.     In-Person       Chief Complaint: Benign prostatic hyperplasia with urinary retention    HPI  Patient is an alert and oriented 59 year old male scheduled for a  Greenlight Photoselective Vaporization of Prostate on 25 with Dr. Gaines for  Benign prostatic hyperplasia with urinary retention. PMHX includes VATS, BPH,insomnia, HLD . Presents to [facility] today for perioperative risk stratification and optimization.     Medical History[1]    Surgical History[2]    Patient  has no history on file for sexual activity.    Family History[3]    Allergies[4]    Prior to Admission medications    Medication Sig Start Date End Date Taking? Authorizing Provider   disulfiram (Antabuse) 250 mg tablet Take 1 tablet (250 mg) by mouth once daily. 3/25/25 9/21/25 Yes Honey Ballard MD   multivitamin tablet Take 1 tablet by mouth once daily.   Yes Historical Provider, MD   QUEtiapine (SEROquel) 25 mg tablet Take 1 tablet (25 mg) by mouth once daily at bedtime. 3/25/25 9/21/25 Yes Honey Ballard MD   rosuvastatin (Crestor) 10 mg tablet Take 1 tablet (10 mg) by mouth once daily. 3/25/25  Yes Honey Ballard MD   tamsulosin (Flomax) 0.4 mg 24 hr capsule Take 1 capsule (0.4 mg) by mouth once daily. Do not crush, chew, or split. 25  Annie Gaines MD          Visit Vitals  /70   Pulse 74   Temp 36.7 °C (98.1 °F) (Temporal)   Resp 14   Ht 1.905 m (6' 3\")   Wt 105 kg (231 lb 11.3 oz)   SpO2 97%   BMI 28.96 kg/m²   Smoking Status Never   BSA 2.36 m²     Review of Systems   Constitutional: Negative for chills, decreased appetite, diaphoresis, fever and malaise/fatigue.   Eyes:  Negative for blurred vision and double vision.   Cardiovascular:  Negative for chest pain, claudication, cyanosis, dyspnea on exertion, irregular heartbeat, leg swelling, near-syncope and palpitations.   Respiratory:  Negative for cough, " hemoptysis, shortness of breath and wheezing.    Endocrine: Negative for cold intolerance, heat intolerance, polydipsia, polyphagia and polyuria.   Gastrointestinal:  Negative for abdominal pain, constipation, diarrhea, dysphagia, nausea and vomiting.   Genitourinary:  Negative for bladder incontinence, dysuria, hematuria, incomplete emptying, nocturia, frequency, pelvic pain and urgency.   Neurological:  Negative for headaches, light-headedness, paresthesias, sensory change and weakness.   Psychiatric/Behavioral:  Negative for altered mental status.    Musculoskeletal: Negative for myalgias, arthralgias     Vitals and nursing note reviewed.     Physical exam  Constitutional:       Appearance: Normal appearance. He is Overweight.   HENT:      Head: Normocephalic and atraumatic.      Mouth/Throat:      Mouth: Mucous membranes are moist.      Pharynx: Oropharynx is clear.   Eyes:      Extraocular Movements: Extraocular movements intact.      Conjunctiva/sclera: Conjunctivae normal.      Pupils: Pupils are equal, round, and reactive to light.   Cardiovascular:      PMI at left midclavicular line. Normal rate. Regular rhythm. Normal S1. Normal S2.       Murmurs: There is no murmur.      No gallop.  No click. No rub.       No audible carotid bruit     No lower extremity edema on exam  Pulmonary:      Effort: Pulmonary effort is normal.      Breath sounds: Normal breath sounds.   Abdominal:      General: Abdomen is flat. Bowel sounds are normal.      Palpations: Abdomen is soft and non-tender  Musculoskeletal:      Cervical back: Normal range of motion and neck supple.   Skin:     General: Skin is warm and dry.      Capillary Refill: Capillary refill takes less than 2 seconds.   Neurological:      General: No focal deficit present.      Mental Status: He is alert and oriented to person, place, and time. Mental status is at baseline.   Psychiatric:         Mood and Affect: Mood normal.         Behavior: Behavior normal.          Thought Content: Thought content normal.         Judgment: Judgment normal.     Vitals and nursing note reviewed. Physical exam within normal limits.     DASI Risk Score      Flowsheet Row Pre-Admission Testing from 7/10/2025 in [facility]   Can you take care of yourself (eat, dress, bathe, or use toilet)?  2.75 filed at 07/10/2025 1349   Can you walk indoors, such as around your house? 1.75 filed at 07/10/2025 1349   Can you walk a block or two on level ground?  2.75 filed at 07/10/2025 1349   Can you climb a flight of stairs or walk up a hill? 5.5 filed at 07/10/2025 1349   Can you run a short distance? 8 filed at 07/10/2025 1349   Can you do light work around the house like dusting or washing dishes? 2.7 filed at 07/10/2025 1349   Can you do moderate work around the house like vacuuming, sweeping floors or carrying groceries? 3.5 filed at 07/10/2025 1349   Can you do heavy work around the house like scrubbing floors or lifting and moving heavy furniture?  8 filed at 07/10/2025 1349   Can you do yard work like raking leaves, weeding or pushing a mower? 4.5 filed at 07/10/2025 1349   Can you have sexual relations? 5.25 filed at 07/10/2025 1349   Can you participate in moderate recreational activities like golf, bowling, dancing, doubles tennis or throwing a baseball or football? 6 filed at 07/10/2025 1349   Can you participate in strenous sports like swimming, singles tennis, football, basketball, or skiing? 0 filed at 07/10/2025 1349   DASI SCORE 50.7 filed at 07/10/2025 1349   METS Score (Will be calculated only when all the questions are answered) 9 filed at 07/10/2025 1349          Caprini DVT Assessment      Flowsheet Row Pre-Admission Testing from 7/10/2025 in [facility]   DVT Score (IF A SCORE IS NOT CALCULATING, MUST SELECT A BMI TO COMPLETE) 5 filed at 07/10/2025 6475   Surgical Factors Major surgery planned, including arthroscopic and laproscopic (1-2 hours) filed at  07/10/2025 1348   BMI (BMI MUST BE CHOSEN) 30 or less filed at 07/10/2025 1348          Modified Frailty Index    No data to display       [id number] Stroke Risk Points  Current as of just now        N/A 0 to 9 Points:      Last Change: N/A          The [id number] risk score (Lip GH, et al. 2009. © 2010 [employer]) quantifies the risk of stroke for a patient with atrial fibrillation. For patients without atrial fibrillation or under the age of 18 this score appears as N/A. Higher score values generally indicate higher risk of stroke.        This score is not applicable to this patient. Components are not calculated.          Revised Cardiac Risk Index      Flowsheet Row Pre-Admission Testing from 7/10/2025 in [facility]   High-Risk Surgery (Intraperitoneal, Intrathoracic,Suprainguinal vascular) 0 filed at 07/10/2025 1349   History of ischemic heart disease (History of MI, History of positive exercuse test, Current chest paint considered due to myocardial ischemia, Use of nitrate therapy, ECG with pathological Q Waves) 0 filed at 07/10/2025 1349   History of congestive heart failure (pulmonary edemia, bilateral rales or S3 gallop, Paroxysmal nocturnal dyspnea, CXR showing pulmonary vascular redistribution) 0 filed at 07/10/2025 1349   History of cerebrovascular disease (Prior TIA or stroke) 0 filed at 07/10/2025 1349   Pre-operative insulin treatment 0 filed at 07/10/2025 1349   Pre-operative creatinine>2 mg/dl 0 filed at 07/10/2025 1349   Revised Cardiac Risk Calculator 0 filed at 07/10/2025 1349          Apfel Simplified Score    No data to display       Risk Analysis Index Results This Encounter    No data found in the last 10 encounters.       Stop Bang Score      Flowsheet Row Pre-Admission Testing from 7/10/2025 in [facility]   Do you snore loudly? 0 filed at 07/10/2025 1310   Do you often feel tired or fatigued after your sleep? 0 filed at  07/10/2025 1310   Has anyone ever observed you stop breathing in your sleep? 0 filed at 07/10/2025 1310   Do you have or are you being treated for high blood pressure? 0 filed at 07/10/2025 1310   Recent BMI (Calculated) 29 filed at 07/10/2025 1310   Is BMI greater than 35 kg/m2? 0=No filed at 07/10/2025 1310   Age older than 50 years old? 1=Yes filed at 07/10/2025 1310   Is your neck circumference greater than 17 inches (Male) or 16 inches (Female)? 0 filed at 07/10/2025 1310   Gender - Male 1=Yes filed at 07/10/2025 1310   STOP-BANG Total Score 2 filed at 07/10/2025 1310          Prodigy: High Risk  Total Score: 16              Prodigy Age Score      Prodigy Gender Score          ARISCAT Score for Postoperative Pulmonary Complications    No data to display       Mahesh Perioperative Risk for Myocardial Infarction or Cardiac Arrest (ALEYDA)      Flowsheet Row Pre-Admission Testing from 7/10/2025 in [facility]   Calculated Age Score 1.2 filed at 07/10/2025 1349   Functional Status  0 filed at 07/10/2025 1349   ASA Class  -3.29 filed at 07/10/2025 1349   Creatinine 0 filed at 07/10/2025 1349   Type of Procedure  -0.26 filed at 07/10/2025 1349   ALEYDA Total Score  -7.6 filed at 07/10/2025 1349   ALEYDA % 0.05 filed at 07/10/2025 1349          Assessment & Plan:    Neuro:  Insomnia (seroquel) .     HEENT/Airway:  No diagnosis or significant findings on chart review or clinical presentation and evaluation.   STOP-BANG Score-2  points low risk for JL    Mallampati::  III    TM distance::  >3 FB    Neck ROM::  Full  Dentures-denies  Crowns-denies  Implants-denies    Cardiovascular:  HLD (crestor)   Aneurysm of ascending aorta without rupture Stable and asymptomatic ascending aortic aneurysm with unchanged size of the aneurysm by recent imaging.  - follows with Cardiology Dr. Saavedra  Bicuspid aortic valve Stable moderate AI and no stenosis, mild TAA  METS: 9  RCRI: 0 points, 3.9%  risk for postoperative MACE    ALEYDA: 0.05% risk for postoperative MACE  EKG -normal sinus rhythm Rate- 66 No acute changes.   ECHO 3/2024  PHYSICIAN INTERPRETATION:  Left Ventricle: The left ventricular systolic function is normal. There are no regional wall motion abnormalities. The left ventricular cavity size is normal. Spectral Doppler shows a normal pattern of left ventricular diastolic filling.  Left Atrium: The left atrium is normal in size.  Right Ventricle: The right ventricle is normal in size. There is normal right ventricular global systolic function.  Right Atrium: The right atrium is normal in size.  Aortic Valve: The aortic valve appears abnormal. The aortic valve appears bicuspid. There is mild aortic valve cusp calcification. Fusion between the right and left coronary cusps. There is moderate aortic valve regurgitation. The peak instantaneous gradient of the aortic valve is 16.3 mmHg. The mean gradient of the aortic valve is 8.8 mmHg.  Mitral Valve: The mitral valve is normal in structure. There is trace mitral valve regurgitation.  Tricuspid Valve: The tricuspid valve is structurally normal. There is mild tricuspid regurgitation.  Pulmonic Valve: The pulmonic valve is structurally normal. There is physiologic pulmonic valve regurgitation.  Pericardium: There is no pericardial effusion noted.  Aorta: The aortic root is abnormal. There is mild dilatation of the ascending aorta.  In comparison to the previous echocardiogram(s): Compared with study from 3/20/2023, no significant change.      CONCLUSIONS:   1. Left ventricular systolic function is normal.   2. Aortic valve appears abnormal.   3. The aortic valve appears bicuspid.   4. Moderate aortic valve regurgitation.    Pulmonary:  VATS right lower lobe common basilar segmentectomy on 11/5/18  ARISCAT: <26 points, 1.6% risk of in-hospital postoperative pulmonary complication  PRODIGY: Moderate risk for opioid induced respiratory depression  Smoking History-He has never  smoked.  Deep breathing handout given    Renal/Urinary:  No diagnosis or significant findings on chart review or clinical presentation and evaluation, however, the patient is at increased risk of perioperative renal complications secondary to HTN. Preventative measures include BP monitoring, medication compliance, and hydration management.   CMP-Pending  Creatinine-  GFR-    Endocrine:  No diagnosis or significant findings on chart review or clinical presentation and evaluation.     Hematologic/Immunology:  No diagnosis or significant findings on chart review or clinical presentation and evaluation.  The patient is not a Jehovah’s witness and will accept blood and blood products if medically indicated.   History of previous blood transfusions No  CBC-Pending  HGB-  Caprini Score 5, patient at Low for postoperative DVT. Pt supplied education/VTE handout  Anticoagulation use: No     Gastrointestinal:   No diagnosis or significant findings on chart review or clinical presentation and evaluation.   Recreational drug use: none  Alcohol use history of alcohol abuse quit over 20 years ago     Infectious disease:   No diagnosis or significant findings on chart review or clinical presentation and evaluation.     Musculoskeletal:   No diagnosis or significant findings on chart review or clinical presentation and evaluation.   JHFRAT score- 4 points. low risk for falls    Anesthesia:  ASA 2 - Patient with mild systemic disease with no functional limitations  Anticipated anesthesia-general  History of General anesthesia- yes  Complications- No anesthesia complications  No family history of anesthesia complications    Labs & Imaging ordered:  CBC, CMP, UA, EKG  Nickel/metal allergy-negative  Shellfish allergy-negative    Discussed with patient medication instructions, NPO guidelines, and any questions or concerns.     time spent 45 minutes  All resulted testing from PAT was forwarded to the surgeon and the patient's PCP if  applicable.           [1]   Past Medical History:  Diagnosis Date    Acute prostatitis 03/08/2017    Acute bacterial prostatitis    Agatston coronary artery calcium score between 100 and 199 03/25/2024     (2023)    Alcohol dependence, uncomplicated (Multi) 01/17/2018    Alcoholism, chronic    Aneurysm of ascending aorta without rupture 03/25/2024    Asc Ao 4.1 (2023)    Personal history of other specified conditions     History of insomnia    Personal history of other specified conditions 11/12/2019    History of chronic cough    Unspecified abnormal findings in urine 03/08/2017    Malodorous urine    Urgency of urination 03/08/2017    Urinary urgency    Urinary tract infection, site not specified 03/08/2017    Bacterial UTI   [2]   Past Surgical History:  Procedure Laterality Date    CT GUIDED CHEST TUBE PLACEMENT  10/18/2018    CT GUIDED CHEST TUBE PLACEMENT 10/18/2018 [facility]    CT GUIDED PERCUTANEOUS BIOPSY LUNG  10/18/2018    CT GUIDED PERCUTANEOUS BIOPSY LUNG 10/18/2018 [facility]    OTHER SURGICAL HISTORY  10/23/2014    Prior Surgical Procedure Not Done    OTHER SURGICAL HISTORY  10/26/2018    Biopsy Lung Percutaneous    OTHER SURGICAL HISTORY  10/26/2018    Bronchoscopy (Diagnostic)    OTHER SURGICAL HISTORY  10/26/2018    Chest Tube Insertion    OTHER SURGICAL HISTORY  11/12/2018    Lung segmental resection   [3] No family history on file.  [4] No Known Allergies

## 2025-07-10 NOTE — H&P (VIEW-ONLY)
"[facility]/PAT Evaluation       Name: Blayne Covarrubias (Blayne Covarrubias \"Kojo\")  /Age: 1965/59 y.o.     In-Person       Chief Complaint: Benign prostatic hyperplasia with urinary retention    HPI  Patient is an alert and oriented 59 year old male scheduled for a  Greenlight Photoselective Vaporization of Prostate on 25 with Dr. Gaines for  Benign prostatic hyperplasia with urinary retention. PMHX includes VATS, BPH,insomnia, HLD . Presents to [facility] today for perioperative risk stratification and optimization.     Medical History[1]    Surgical History[2]    Patient  has no history on file for sexual activity.    Family History[3]    Allergies[4]    Prior to Admission medications    Medication Sig Start Date End Date Taking? Authorizing Provider   disulfiram (Antabuse) 250 mg tablet Take 1 tablet (250 mg) by mouth once daily. 3/25/25 9/21/25 Yes Honey Ballard MD   multivitamin tablet Take 1 tablet by mouth once daily.   Yes Historical Provider, MD   QUEtiapine (SEROquel) 25 mg tablet Take 1 tablet (25 mg) by mouth once daily at bedtime. 3/25/25 9/21/25 Yes Honey Ballard MD   rosuvastatin (Crestor) 10 mg tablet Take 1 tablet (10 mg) by mouth once daily. 3/25/25  Yes Honey Ballard MD   tamsulosin (Flomax) 0.4 mg 24 hr capsule Take 1 capsule (0.4 mg) by mouth once daily. Do not crush, chew, or split. 25  Annie Gaines MD          Visit Vitals  /70   Pulse 74   Temp 36.7 °C (98.1 °F) (Temporal)   Resp 14   Ht 1.905 m (6' 3\")   Wt 105 kg (231 lb 11.3 oz)   SpO2 97%   BMI 28.96 kg/m²   Smoking Status Never   BSA 2.36 m²     Review of Systems   Constitutional: Negative for chills, decreased appetite, diaphoresis, fever and malaise/fatigue.   Eyes:  Negative for blurred vision and double vision.   Cardiovascular:  Negative for chest pain, claudication, cyanosis, dyspnea on exertion, irregular heartbeat, leg swelling, near-syncope and palpitations.   Respiratory:  Negative for cough, " hemoptysis, shortness of breath and wheezing.    Endocrine: Negative for cold intolerance, heat intolerance, polydipsia, polyphagia and polyuria.   Gastrointestinal:  Negative for abdominal pain, constipation, diarrhea, dysphagia, nausea and vomiting.   Genitourinary:  Negative for bladder incontinence, dysuria, hematuria, incomplete emptying, nocturia, frequency, pelvic pain and urgency.   Neurological:  Negative for headaches, light-headedness, paresthesias, sensory change and weakness.   Psychiatric/Behavioral:  Negative for altered mental status.    Musculoskeletal: Negative for myalgias, arthralgias     Vitals and nursing note reviewed.     Physical exam  Constitutional:       Appearance: Normal appearance. He is Overweight.   HENT:      Head: Normocephalic and atraumatic.      Mouth/Throat:      Mouth: Mucous membranes are moist.      Pharynx: Oropharynx is clear.   Eyes:      Extraocular Movements: Extraocular movements intact.      Conjunctiva/sclera: Conjunctivae normal.      Pupils: Pupils are equal, round, and reactive to light.   Cardiovascular:      PMI at left midclavicular line. Normal rate. Regular rhythm. Normal S1. Normal S2.       Murmurs: There is no murmur.      No gallop.  No click. No rub.       No audible carotid bruit     No lower extremity edema on exam  Pulmonary:      Effort: Pulmonary effort is normal.      Breath sounds: Normal breath sounds.   Abdominal:      General: Abdomen is flat. Bowel sounds are normal.      Palpations: Abdomen is soft and non-tender  Musculoskeletal:      Cervical back: Normal range of motion and neck supple.   Skin:     General: Skin is warm and dry.      Capillary Refill: Capillary refill takes less than 2 seconds.   Neurological:      General: No focal deficit present.      Mental Status: He is alert and oriented to person, place, and time. Mental status is at baseline.   Psychiatric:         Mood and Affect: Mood normal.         Behavior: Behavior normal.          Thought Content: Thought content normal.         Judgment: Judgment normal.     Vitals and nursing note reviewed. Physical exam within normal limits.     DASI Risk Score      Flowsheet Row Pre-Admission Testing from 7/10/2025 in [facility]   Can you take care of yourself (eat, dress, bathe, or use toilet)?  2.75 filed at 07/10/2025 1349   Can you walk indoors, such as around your house? 1.75 filed at 07/10/2025 1349   Can you walk a block or two on level ground?  2.75 filed at 07/10/2025 1349   Can you climb a flight of stairs or walk up a hill? 5.5 filed at 07/10/2025 1349   Can you run a short distance? 8 filed at 07/10/2025 1349   Can you do light work around the house like dusting or washing dishes? 2.7 filed at 07/10/2025 1349   Can you do moderate work around the house like vacuuming, sweeping floors or carrying groceries? 3.5 filed at 07/10/2025 1349   Can you do heavy work around the house like scrubbing floors or lifting and moving heavy furniture?  8 filed at 07/10/2025 1349   Can you do yard work like raking leaves, weeding or pushing a mower? 4.5 filed at 07/10/2025 1349   Can you have sexual relations? 5.25 filed at 07/10/2025 1349   Can you participate in moderate recreational activities like golf, bowling, dancing, doubles tennis or throwing a baseball or football? 6 filed at 07/10/2025 1349   Can you participate in strenous sports like swimming, singles tennis, football, basketball, or skiing? 0 filed at 07/10/2025 1349   DASI SCORE 50.7 filed at 07/10/2025 1349   METS Score (Will be calculated only when all the questions are answered) 9 filed at 07/10/2025 1349          Caprini DVT Assessment      Flowsheet Row Pre-Admission Testing from 7/10/2025 in [facility]   DVT Score (IF A SCORE IS NOT CALCULATING, MUST SELECT A BMI TO COMPLETE) 5 filed at 07/10/2025 6822   Surgical Factors Major surgery planned, including arthroscopic and laproscopic (1-2 hours) filed at  07/10/2025 1348   BMI (BMI MUST BE CHOSEN) 30 or less filed at 07/10/2025 1348          Modified Frailty Index    No data to display       [id number] Stroke Risk Points  Current as of just now        N/A 0 to 9 Points:      Last Change: N/A          The [id number] risk score (Lip GH, et al. 2009. © 2010 [employer]) quantifies the risk of stroke for a patient with atrial fibrillation. For patients without atrial fibrillation or under the age of 18 this score appears as N/A. Higher score values generally indicate higher risk of stroke.        This score is not applicable to this patient. Components are not calculated.          Revised Cardiac Risk Index      Flowsheet Row Pre-Admission Testing from 7/10/2025 in [facility]   High-Risk Surgery (Intraperitoneal, Intrathoracic,Suprainguinal vascular) 0 filed at 07/10/2025 1349   History of ischemic heart disease (History of MI, History of positive exercuse test, Current chest paint considered due to myocardial ischemia, Use of nitrate therapy, ECG with pathological Q Waves) 0 filed at 07/10/2025 1349   History of congestive heart failure (pulmonary edemia, bilateral rales or S3 gallop, Paroxysmal nocturnal dyspnea, CXR showing pulmonary vascular redistribution) 0 filed at 07/10/2025 1349   History of cerebrovascular disease (Prior TIA or stroke) 0 filed at 07/10/2025 1349   Pre-operative insulin treatment 0 filed at 07/10/2025 1349   Pre-operative creatinine>2 mg/dl 0 filed at 07/10/2025 1349   Revised Cardiac Risk Calculator 0 filed at 07/10/2025 1349          Apfel Simplified Score    No data to display       Risk Analysis Index Results This Encounter    No data found in the last 10 encounters.       Stop Bang Score      Flowsheet Row Pre-Admission Testing from 7/10/2025 in [facility]   Do you snore loudly? 0 filed at 07/10/2025 1310   Do you often feel tired or fatigued after your sleep? 0 filed at  07/10/2025 1310   Has anyone ever observed you stop breathing in your sleep? 0 filed at 07/10/2025 1310   Do you have or are you being treated for high blood pressure? 0 filed at 07/10/2025 1310   Recent BMI (Calculated) 29 filed at 07/10/2025 1310   Is BMI greater than 35 kg/m2? 0=No filed at 07/10/2025 1310   Age older than 50 years old? 1=Yes filed at 07/10/2025 1310   Is your neck circumference greater than 17 inches (Male) or 16 inches (Female)? 0 filed at 07/10/2025 1310   Gender - Male 1=Yes filed at 07/10/2025 1310   STOP-BANG Total Score 2 filed at 07/10/2025 1310          Prodigy: High Risk  Total Score: 16              Prodigy Age Score      Prodigy Gender Score          ARISCAT Score for Postoperative Pulmonary Complications    No data to display       Mahesh Perioperative Risk for Myocardial Infarction or Cardiac Arrest (ALEYDA)      Flowsheet Row Pre-Admission Testing from 7/10/2025 in [facility]   Calculated Age Score 1.2 filed at 07/10/2025 1349   Functional Status  0 filed at 07/10/2025 1349   ASA Class  -3.29 filed at 07/10/2025 1349   Creatinine 0 filed at 07/10/2025 1349   Type of Procedure  -0.26 filed at 07/10/2025 1349   ALEYDA Total Score  -7.6 filed at 07/10/2025 1349   ALEYDA % 0.05 filed at 07/10/2025 1349          Assessment & Plan:    Neuro:  Insomnia (seroquel) .     HEENT/Airway:  No diagnosis or significant findings on chart review or clinical presentation and evaluation.   STOP-BANG Score-2  points low risk for JL    Mallampati::  III    TM distance::  >3 FB    Neck ROM::  Full  Dentures-denies  Crowns-denies  Implants-denies    Cardiovascular:  HLD (crestor)   Aneurysm of ascending aorta without rupture Stable and asymptomatic ascending aortic aneurysm with unchanged size of the aneurysm by recent imaging.  - follows with Cardiology Dr. Saavedra  Bicuspid aortic valve Stable moderate AI and no stenosis, mild TAA  METS: 9  RCRI: 0 points, 3.9%  risk for postoperative MACE    ALEYDA: 0.05% risk for postoperative MACE  EKG -normal sinus rhythm Rate- 66 No acute changes.   ECHO 3/2024  PHYSICIAN INTERPRETATION:  Left Ventricle: The left ventricular systolic function is normal. There are no regional wall motion abnormalities. The left ventricular cavity size is normal. Spectral Doppler shows a normal pattern of left ventricular diastolic filling.  Left Atrium: The left atrium is normal in size.  Right Ventricle: The right ventricle is normal in size. There is normal right ventricular global systolic function.  Right Atrium: The right atrium is normal in size.  Aortic Valve: The aortic valve appears abnormal. The aortic valve appears bicuspid. There is mild aortic valve cusp calcification. Fusion between the right and left coronary cusps. There is moderate aortic valve regurgitation. The peak instantaneous gradient of the aortic valve is 16.3 mmHg. The mean gradient of the aortic valve is 8.8 mmHg.  Mitral Valve: The mitral valve is normal in structure. There is trace mitral valve regurgitation.  Tricuspid Valve: The tricuspid valve is structurally normal. There is mild tricuspid regurgitation.  Pulmonic Valve: The pulmonic valve is structurally normal. There is physiologic pulmonic valve regurgitation.  Pericardium: There is no pericardial effusion noted.  Aorta: The aortic root is abnormal. There is mild dilatation of the ascending aorta.  In comparison to the previous echocardiogram(s): Compared with study from 3/20/2023, no significant change.      CONCLUSIONS:   1. Left ventricular systolic function is normal.   2. Aortic valve appears abnormal.   3. The aortic valve appears bicuspid.   4. Moderate aortic valve regurgitation.    Pulmonary:  VATS right lower lobe common basilar segmentectomy on 11/5/18  ARISCAT: <26 points, 1.6% risk of in-hospital postoperative pulmonary complication  PRODIGY: Moderate risk for opioid induced respiratory depression  Smoking History-He has never  smoked.  Deep breathing handout given    Renal/Urinary:  No diagnosis or significant findings on chart review or clinical presentation and evaluation, however, the patient is at increased risk of perioperative renal complications secondary to HTN. Preventative measures include BP monitoring, medication compliance, and hydration management.   CMP-Pending  Creatinine-  GFR-    Endocrine:  No diagnosis or significant findings on chart review or clinical presentation and evaluation.     Hematologic/Immunology:  No diagnosis or significant findings on chart review or clinical presentation and evaluation.  The patient is not a Jehovah’s witness and will accept blood and blood products if medically indicated.   History of previous blood transfusions No  CBC-Pending  HGB-  Caprini Score 5, patient at Low for postoperative DVT. Pt supplied education/VTE handout  Anticoagulation use: No     Gastrointestinal:   No diagnosis or significant findings on chart review or clinical presentation and evaluation.   Recreational drug use: none  Alcohol use history of alcohol abuse quit over 20 years ago     Infectious disease:   No diagnosis or significant findings on chart review or clinical presentation and evaluation.     Musculoskeletal:   No diagnosis or significant findings on chart review or clinical presentation and evaluation.   JHFRAT score- 4 points. low risk for falls    Anesthesia:  ASA 2 - Patient with mild systemic disease with no functional limitations  Anticipated anesthesia-general  History of General anesthesia- yes  Complications- No anesthesia complications  No family history of anesthesia complications    Labs & Imaging ordered:  CBC, CMP, UA, EKG  Nickel/metal allergy-negative  Shellfish allergy-negative    Discussed with patient medication instructions, NPO guidelines, and any questions or concerns.     time spent 45 minutes  All resulted testing from PAT was forwarded to the surgeon and the patient's PCP if  applicable.           [1]   Past Medical History:  Diagnosis Date    Acute prostatitis 03/08/2017    Acute bacterial prostatitis    Agatston coronary artery calcium score between 100 and 199 03/25/2024     (2023)    Alcohol dependence, uncomplicated (Multi) 01/17/2018    Alcoholism, chronic    Aneurysm of ascending aorta without rupture 03/25/2024    Asc Ao 4.1 (2023)    Personal history of other specified conditions     History of insomnia    Personal history of other specified conditions 11/12/2019    History of chronic cough    Unspecified abnormal findings in urine 03/08/2017    Malodorous urine    Urgency of urination 03/08/2017    Urinary urgency    Urinary tract infection, site not specified 03/08/2017    Bacterial UTI   [2]   Past Surgical History:  Procedure Laterality Date    CT GUIDED CHEST TUBE PLACEMENT  10/18/2018    CT GUIDED CHEST TUBE PLACEMENT 10/18/2018 [facility]    CT GUIDED PERCUTANEOUS BIOPSY LUNG  10/18/2018    CT GUIDED PERCUTANEOUS BIOPSY LUNG 10/18/2018 [facility]    OTHER SURGICAL HISTORY  10/23/2014    Prior Surgical Procedure Not Done    OTHER SURGICAL HISTORY  10/26/2018    Biopsy Lung Percutaneous    OTHER SURGICAL HISTORY  10/26/2018    Bronchoscopy (Diagnostic)    OTHER SURGICAL HISTORY  10/26/2018    Chest Tube Insertion    OTHER SURGICAL HISTORY  11/12/2018    Lung segmental resection   [3] No family history on file.  [4] No Known Allergies

## 2025-07-11 DIAGNOSIS — R33.8 BENIGN PROSTATIC HYPERPLASIA WITH URINARY RETENTION: ICD-10-CM

## 2025-07-11 DIAGNOSIS — Z01.812 PRE-OPERATIVE LABORATORY EXAMINATION: ICD-10-CM

## 2025-07-11 DIAGNOSIS — N40.1 BENIGN PROSTATIC HYPERPLASIA WITH URINARY RETENTION: ICD-10-CM

## 2025-07-11 LAB
ATRIAL RATE: 66 BPM
P AXIS: 55 DEGREES
P OFFSET: 200 MS
P ONSET: 139 MS
PR INTERVAL: 162 MS
Q ONSET: 220 MS
QRS COUNT: 10 BEATS
QRS DURATION: 102 MS
QT INTERVAL: 406 MS
QTC CALCULATION(BAZETT): 425 MS
QTC FREDERICIA: 419 MS
R AXIS: -22 DEGREES
T AXIS: 43 DEGREES
T OFFSET: 423 MS
VENTRICULAR RATE: 66 BPM

## 2025-07-12 LAB
ANION GAP SERPL CALCULATED.4IONS-SCNC: 10 MMOL/L (CALC) (ref 7–17)
BACTERIA UR CULT: NORMAL
BUN SERPL-MCNC: 19 MG/DL (ref 7–25)
BUN/CREAT SERPL: NORMAL (CALC) (ref 6–22)
CALCIUM SERPL-MCNC: 9.7 MG/DL (ref 8.6–10.3)
CHLORIDE SERPL-SCNC: 104 MMOL/L (ref 98–110)
CO2 SERPL-SCNC: 25 MMOL/L (ref 20–32)
CREAT SERPL-MCNC: 1.32 MG/DL (ref 0.7–1.35)
EGFRCR SERPLBLD CKD-EPI 2021: 62 ML/MIN/1.73M2
ERYTHROCYTE [DISTWIDTH] IN BLOOD BY AUTOMATED COUNT: 11.8 % (ref 11–15)
GLUCOSE SERPL-MCNC: 88 MG/DL (ref 65–99)
HCT VFR BLD AUTO: 44.6 % (ref 38.5–50)
HGB BLD-MCNC: 15.2 G/DL (ref 13.2–17.1)
INR PPP: 1.1
MCH RBC QN AUTO: 30.5 PG (ref 27–33)
MCHC RBC AUTO-ENTMCNC: 34.1 G/DL (ref 32–36)
MCV RBC AUTO: 89.4 FL (ref 80–100)
PLATELET # BLD AUTO: 229 THOUSAND/UL (ref 140–400)
PMV BLD REES-ECKER: 10.4 FL (ref 7.5–12.5)
POTASSIUM SERPL-SCNC: 4.5 MMOL/L (ref 3.5–5.3)
PROTHROMBIN TIME: 11.3 SEC (ref 9–11.5)
RBC # BLD AUTO: 4.99 MILLION/UL (ref 4.2–5.8)
SODIUM SERPL-SCNC: 139 MMOL/L (ref 135–146)
WBC # BLD AUTO: 6 THOUSAND/UL (ref 3.8–10.8)

## 2025-07-24 NOTE — DISCHARGE INSTRUCTIONS
Diet  You can eat whatever you like after your surgery. Sometimes the anesthesia can cause nausea, so it may be a good idea to stay away from heavy foods right after you get home from the procedure.    Ku catheter  You will have a tube in the bladder called a ku catheter. This drains urine from the bladder and exits the penis. Be sure the catheter is well secured to the leg at all times. This catheter typically stays in from one day to a week (7 days) depending on your circumstances. There should never be any tension or tugging on the catheter. Take care not to pull on the catheter when rolling in bed or changing position. The nurses will show you how to attach the ku catheter to a leg bag during the day and a big bag at night.    The ku catheter has a balloon on the end of it to keep it in place in the bladder. This may give you the feeling you need to urinate. Be assured the catheter is draining and the sensation is from the ku catheter balloon. You may also notice urine or blood-tinged urine leaking around the catheter out the tip of the penis. Typically, this due to a bladder spasm and is not a cause for alarm. If the catheter stops draining, get up and walk around. If it is still not draining, call the office or come to the emergency room as it may be clogged and need to be irrigated. Once the ku catheter is removed, it is normal to have burning and stinging with urination for a few weeks after surgery. It is also common to have more frequent urination and a greater sense of the urge to urinate. There may not be much warning from the time you feel the urge to urinate to the time when the bladder is ready to empty.    Activity  It is very important to walk after your procedure. Walking prevents blood clots in the legs or lungs. You may go up and down stairs. Avoid any strenuous activity or lifting more than ten pounds for 3 to 4 weeks. This includes any heavy lifting, running, riding a bicycle  or golf. This also includes activities such as raking leaves, mowing the lawn, shoveling snow or other strenuous chores. If you see blood in the urine, increase the amount of water you are drinking and avoid strenuous activity or heavy lifting until the blood clears.    Medications  Take the medications prescribed at the time of your discharge from the hospital. If you are taking any medications on a regular basis prior to your admission to the hospital, you should continue to take those as well. For any aches, pains or headaches, you may use Tylenol or Extra-Strength Tylenol. Sometimes, you will be given a prescription for other pain killers. Do not use any aspirin or aspirin-like compounds or ibuprofen products (NSAIDs) (eg. Advil, Nuprin, Motrin, Bufferin, etc.) for four weeks after surgery. If you start aspirin or aspirin like compounds and you notice blood in your urine, please stop taking it and increase your water intake. Pyridium will be called in that will help with burning.  It will color your urine orange.  Antibiotic will be also prescribed for 1 week.    Avoid constipation  Anesthesia, surgery and narcotic pain medication all increase your risk for constipation. Do not strain to move the bowels as this can impair the healing process and start bleeding. Take plenty of fiber, water and over the counter stool softener to avoid constipation. Stool softener can be taken by mouth twice a day to avoid constipation. A stool softener or laxative is available at any drug store without a prescription (senna or Senokot or SennaGen, Dulcolax or bisacodyl, Miralax, Metamucil, Milk of Magnesia or magnesium hydroxide). Decrease or hold the stool softener for diarrhea or loose stools.    Follow up plan  If you develop a fever greater than 101 degrees Fahrenheit, the catheter stops draining or you are unable to urinate, call the office or come to the emergency room.  Your catheter removal has been scheduled  Please call  [phone] and follow prompts for Dr. Gaines's  if you are not sure of your appointment time or location

## 2025-07-25 ENCOUNTER — ANESTHESIA (OUTPATIENT)
Dept: OPERATING ROOM | Facility: HOSPITAL | Age: 60
End: 2025-07-25
Payer: COMMERCIAL

## 2025-07-25 ENCOUNTER — HOSPITAL ENCOUNTER (OUTPATIENT)
Facility: HOSPITAL | Age: 60
Setting detail: OUTPATIENT SURGERY
Discharge: HOME | End: 2025-07-25
Attending: UROLOGY | Admitting: UROLOGY
Payer: COMMERCIAL

## 2025-07-25 ENCOUNTER — ANESTHESIA EVENT (OUTPATIENT)
Dept: OPERATING ROOM | Facility: HOSPITAL | Age: 60
End: 2025-07-25
Payer: COMMERCIAL

## 2025-07-25 VITALS
SYSTOLIC BLOOD PRESSURE: 137 MMHG | RESPIRATION RATE: 18 BRPM | BODY MASS INDEX: 28.52 KG/M2 | WEIGHT: 228.18 LBS | OXYGEN SATURATION: 100 % | TEMPERATURE: 97.2 F | HEART RATE: 67 BPM | DIASTOLIC BLOOD PRESSURE: 68 MMHG

## 2025-07-25 DIAGNOSIS — R33.8 BENIGN PROSTATIC HYPERPLASIA WITH URINARY RETENTION: Primary | ICD-10-CM

## 2025-07-25 DIAGNOSIS — N40.1 BENIGN PROSTATIC HYPERPLASIA WITH URINARY RETENTION: Primary | ICD-10-CM

## 2025-07-25 LAB — GLUCOSE BLD MANUAL STRIP-MCNC: 93 MG/DL (ref 74–99)

## 2025-07-25 PROCEDURE — 7100000002 HC RECOVERY ROOM TIME - EACH INCREMENTAL 1 MINUTE: Performed by: UROLOGY

## 2025-07-25 PROCEDURE — 2500000004 HC RX 250 GENERAL PHARMACY W/ HCPCS (ALT 636 FOR OP/ED): Performed by: STUDENT IN AN ORGANIZED HEALTH CARE EDUCATION/TRAINING PROGRAM

## 2025-07-25 PROCEDURE — 2500000005 HC RX 250 GENERAL PHARMACY W/O HCPCS: Performed by: UROLOGY

## 2025-07-25 PROCEDURE — 7100000010 HC PHASE TWO TIME - EACH INCREMENTAL 1 MINUTE: Performed by: UROLOGY

## 2025-07-25 PROCEDURE — 7100000009 HC PHASE TWO TIME - INITIAL BASE CHARGE: Performed by: UROLOGY

## 2025-07-25 PROCEDURE — 52648 LASER SURGERY OF PROSTATE: CPT | Performed by: UROLOGY

## 2025-07-25 PROCEDURE — 7100000001 HC RECOVERY ROOM TIME - INITIAL BASE CHARGE: Performed by: UROLOGY

## 2025-07-25 PROCEDURE — 3600000005 HC OR TIME - INITIAL BASE CHARGE - PROCEDURE LEVEL FIVE: Performed by: UROLOGY

## 2025-07-25 PROCEDURE — C1889 IMPLANT/INSERT DEVICE, NOC: HCPCS | Performed by: UROLOGY

## 2025-07-25 PROCEDURE — 3600000010 HC OR TIME - EACH INCREMENTAL 1 MINUTE - PROCEDURE LEVEL FIVE: Performed by: UROLOGY

## 2025-07-25 PROCEDURE — 51702 INSERT TEMP BLADDER CATH: CPT | Mod: CCI

## 2025-07-25 PROCEDURE — 2720000007 HC OR 272 NO HCPCS: Performed by: UROLOGY

## 2025-07-25 PROCEDURE — 2500000004 HC RX 250 GENERAL PHARMACY W/ HCPCS (ALT 636 FOR OP/ED): Performed by: UROLOGY

## 2025-07-25 PROCEDURE — 3700000002 HC GENERAL ANESTHESIA TIME - EACH INCREMENTAL 1 MINUTE: Performed by: UROLOGY

## 2025-07-25 PROCEDURE — 82947 ASSAY GLUCOSE BLOOD QUANT: CPT

## 2025-07-25 PROCEDURE — 3700000001 HC GENERAL ANESTHESIA TIME - INITIAL BASE CHARGE: Performed by: UROLOGY

## 2025-07-25 RX ORDER — MEPERIDINE HYDROCHLORIDE 25 MG/ML
12.5 INJECTION INTRAMUSCULAR; INTRAVENOUS; SUBCUTANEOUS EVERY 10 MIN PRN
Status: DISCONTINUED | OUTPATIENT
Start: 2025-07-25 | End: 2025-07-25 | Stop reason: HOSPADM

## 2025-07-25 RX ORDER — HYDROMORPHONE HYDROCHLORIDE 0.2 MG/ML
0.2 INJECTION INTRAMUSCULAR; INTRAVENOUS; SUBCUTANEOUS EVERY 5 MIN PRN
Status: DISCONTINUED | OUTPATIENT
Start: 2025-07-25 | End: 2025-07-25 | Stop reason: HOSPADM

## 2025-07-25 RX ORDER — MIDAZOLAM HYDROCHLORIDE 1 MG/ML
INJECTION, SOLUTION INTRAMUSCULAR; INTRAVENOUS AS NEEDED
Status: DISCONTINUED | OUTPATIENT
Start: 2025-07-25 | End: 2025-07-25

## 2025-07-25 RX ORDER — ONDANSETRON HYDROCHLORIDE 2 MG/ML
INJECTION, SOLUTION INTRAVENOUS AS NEEDED
Status: DISCONTINUED | OUTPATIENT
Start: 2025-07-25 | End: 2025-07-25

## 2025-07-25 RX ORDER — CEFAZOLIN SODIUM 2 G/100ML
2 INJECTION, SOLUTION INTRAVENOUS ONCE
Status: COMPLETED | OUTPATIENT
Start: 2025-07-25 | End: 2025-07-25

## 2025-07-25 RX ORDER — CEPHALEXIN 500 MG/1
500 CAPSULE ORAL 4 TIMES DAILY
Qty: 20 CAPSULE | Refills: 0 | Status: SHIPPED | OUTPATIENT
Start: 2025-07-25 | End: 2025-07-30

## 2025-07-25 RX ORDER — FENTANYL CITRATE 50 UG/ML
50 INJECTION, SOLUTION INTRAMUSCULAR; INTRAVENOUS EVERY 5 MIN PRN
Status: DISCONTINUED | OUTPATIENT
Start: 2025-07-25 | End: 2025-07-25 | Stop reason: HOSPADM

## 2025-07-25 RX ORDER — ONDANSETRON HYDROCHLORIDE 2 MG/ML
4 INJECTION, SOLUTION INTRAVENOUS ONCE AS NEEDED
Status: DISCONTINUED | OUTPATIENT
Start: 2025-07-25 | End: 2025-07-25 | Stop reason: HOSPADM

## 2025-07-25 RX ORDER — IPRATROPIUM BROMIDE 0.5 MG/2.5ML
500 SOLUTION RESPIRATORY (INHALATION) EVERY 30 MIN PRN
Status: DISCONTINUED | OUTPATIENT
Start: 2025-07-25 | End: 2025-07-25 | Stop reason: HOSPADM

## 2025-07-25 RX ORDER — OXYCODONE HYDROCHLORIDE 5 MG/1
5 TABLET ORAL EVERY 4 HOURS PRN
Status: DISCONTINUED | OUTPATIENT
Start: 2025-07-25 | End: 2025-07-25 | Stop reason: HOSPADM

## 2025-07-25 RX ORDER — LIDOCAINE HYDROCHLORIDE 20 MG/ML
JELLY TOPICAL AS NEEDED
Status: DISCONTINUED | OUTPATIENT
Start: 2025-07-25 | End: 2025-07-25 | Stop reason: HOSPADM

## 2025-07-25 RX ORDER — SODIUM CHLORIDE, SODIUM LACTATE, POTASSIUM CHLORIDE, CALCIUM CHLORIDE 600; 310; 30; 20 MG/100ML; MG/100ML; MG/100ML; MG/100ML
40 INJECTION, SOLUTION INTRAVENOUS CONTINUOUS
Status: DISCONTINUED | OUTPATIENT
Start: 2025-07-25 | End: 2025-07-25 | Stop reason: HOSPADM

## 2025-07-25 RX ORDER — FENTANYL CITRATE 50 UG/ML
INJECTION, SOLUTION INTRAMUSCULAR; INTRAVENOUS AS NEEDED
Status: DISCONTINUED | OUTPATIENT
Start: 2025-07-25 | End: 2025-07-25

## 2025-07-25 RX ORDER — ALBUTEROL SULFATE 0.83 MG/ML
2.5 SOLUTION RESPIRATORY (INHALATION) EVERY 30 MIN PRN
Status: DISCONTINUED | OUTPATIENT
Start: 2025-07-25 | End: 2025-07-25 | Stop reason: HOSPADM

## 2025-07-25 RX ORDER — LIDOCAINE HYDROCHLORIDE 10 MG/ML
INJECTION, SOLUTION EPIDURAL; INFILTRATION; INTRACAUDAL; PERINEURAL AS NEEDED
Status: DISCONTINUED | OUTPATIENT
Start: 2025-07-25 | End: 2025-07-25

## 2025-07-25 RX ORDER — PHENAZOPYRIDINE HYDROCHLORIDE 200 MG/1
200 TABLET, FILM COATED ORAL 3 TIMES DAILY PRN
Qty: 15 TABLET | Refills: 0 | Status: SHIPPED | OUTPATIENT
Start: 2025-07-25 | End: 2025-07-30

## 2025-07-25 RX ORDER — SODIUM CHLORIDE, SODIUM LACTATE, POTASSIUM CHLORIDE, CALCIUM CHLORIDE 600; 310; 30; 20 MG/100ML; MG/100ML; MG/100ML; MG/100ML
20 INJECTION, SOLUTION INTRAVENOUS CONTINUOUS
Status: DISCONTINUED | OUTPATIENT
Start: 2025-07-25 | End: 2025-07-25 | Stop reason: HOSPADM

## 2025-07-25 RX ORDER — LABETALOL HYDROCHLORIDE 5 MG/ML
5 INJECTION, SOLUTION INTRAVENOUS EVERY 5 MIN PRN
Status: DISCONTINUED | OUTPATIENT
Start: 2025-07-25 | End: 2025-07-25 | Stop reason: HOSPADM

## 2025-07-25 RX ORDER — PROPOFOL 10 MG/ML
INJECTION, EMULSION INTRAVENOUS AS NEEDED
Status: DISCONTINUED | OUTPATIENT
Start: 2025-07-25 | End: 2025-07-25

## 2025-07-25 RX ADMIN — SODIUM CHLORIDE, POTASSIUM CHLORIDE, SODIUM LACTATE AND CALCIUM CHLORIDE: 600; 310; 30; 20 INJECTION, SOLUTION INTRAVENOUS at 09:28

## 2025-07-25 RX ADMIN — DEXAMETHASONE SODIUM PHOSPHATE 4 MG: 4 INJECTION, SOLUTION INTRAMUSCULAR; INTRAVENOUS at 09:30

## 2025-07-25 RX ADMIN — CEFAZOLIN SODIUM 2 G: 2 INJECTION, SOLUTION INTRAVENOUS at 09:27

## 2025-07-25 RX ADMIN — MIDAZOLAM 2 MG: 1 INJECTION INTRAMUSCULAR; INTRAVENOUS at 09:25

## 2025-07-25 RX ADMIN — PROPOFOL 200 MG: 10 INJECTION, EMULSION INTRAVENOUS at 09:30

## 2025-07-25 RX ADMIN — LIDOCAINE HYDROCHLORIDE 5 ML: 10 INJECTION, SOLUTION EPIDURAL; INFILTRATION; INTRACAUDAL; PERINEURAL at 09:30

## 2025-07-25 RX ADMIN — ONDANSETRON 4 MG: 2 INJECTION, SOLUTION INTRAMUSCULAR; INTRAVENOUS at 10:25

## 2025-07-25 RX ADMIN — FENTANYL CITRATE 50 MCG: 50 INJECTION, SOLUTION INTRAMUSCULAR; INTRAVENOUS at 10:25

## 2025-07-25 RX ADMIN — FENTANYL CITRATE 50 MCG: 50 INJECTION, SOLUTION INTRAMUSCULAR; INTRAVENOUS at 09:30

## 2025-07-25 SDOH — HEALTH STABILITY: MENTAL HEALTH: CURRENT SMOKER: 0

## 2025-07-25 ASSESSMENT — PAIN - FUNCTIONAL ASSESSMENT
PAIN_FUNCTIONAL_ASSESSMENT: 0-10

## 2025-07-25 ASSESSMENT — PAIN SCALES - GENERAL
PAINLEVEL_OUTOF10: 0 - NO PAIN

## 2025-07-25 ASSESSMENT — COLUMBIA-SUICIDE SEVERITY RATING SCALE - C-SSRS
6. HAVE YOU EVER DONE ANYTHING, STARTED TO DO ANYTHING, OR PREPARED TO DO ANYTHING TO END YOUR LIFE?: NO
1. IN THE PAST MONTH, HAVE YOU WISHED YOU WERE DEAD OR WISHED YOU COULD GO TO SLEEP AND NOT WAKE UP?: NO
2. HAVE YOU ACTUALLY HAD ANY THOUGHTS OF KILLING YOURSELF?: NO

## 2025-07-25 NOTE — PERIOPERATIVE NURSING NOTE
1040: patient admitted to pacu, patient with indwelling ku catheter, draining light pink tinged urine.  1050: tolerating fluids with no n/v.  1115: patient to phase II

## 2025-07-25 NOTE — ANESTHESIA PREPROCEDURE EVALUATION
"Patient: Blayne Covarrubias \"Kojo\"    Procedure Information       Date/Time: 07/25/25 1050    Procedure: Greenlight Photoselective Vaporization of Prostate (Greenlight)    Location: [facility] / Virtual [facility]    Surgeons: Annie Gaines MD            Relevant Problems   Anesthesia (within normal limits)      Cardiac   (+) Aneurysm of ascending aorta without rupture   (+) Atherosclerotic heart disease of native coronary artery without angina pectoris   (+) Bicuspid aortic valve   (+) Mixed hyperlipidemia   (+) Nonrheumatic aortic (valve) insufficiency      /Renal   (+) Benign prostatic hyperplasia with urinary retention       Clinical information reviewed:                   NPO Detail:  No data recorded     Physical Exam    Airway  Mallampati: II  TM distance: >3 FB  Neck ROM: full     Cardiovascular Comments: deferred   Dental   Comments: No loose teeth     Pulmonary Comments: deferred   Abdominal   Comments: deferred           Anesthesia Plan    History of general anesthesia?: yes  History of complications of general anesthesia?: no    ASA 3     general     The patient is not a current smoker.  Patient was not previously instructed to abstain from smoking on day of procedure.  Patient did not smoke on day of procedure.  Education provided regarding risk of obstructive sleep apnea.  intravenous induction   Postoperative pain plan includes opioids.  Anesthetic plan and risks discussed with patient.  Use of blood products discussed with patient who consented to blood products.    Plan discussed with CRNA and CAA.      "

## 2025-07-25 NOTE — ANESTHESIA PROCEDURE NOTES
Airway  Date/Time: 7/25/2025 9:31 AM  Reason: elective    Airway not difficult    Staffing  Performed: attending   Authorized by: Jose Maria Burt DO    Performed by: Jose Maria Burt DO  Patient location during procedure: OR    Patient Condition  Indications for airway management: anesthesia  Patient position: sniffing  Sedation level: deep     Final Airway Details   Preoxygenated: yes  Final airway type: supraglottic airway  Successful airway: classic  Number of attempts at approach: 1

## 2025-07-25 NOTE — OP NOTE
"Greenlight Photoselective Vaporization of Prostate (Greenlight) Operative Note     Date: 2025  OR Location: [facility]    Name: Blayne Covarrubias \"Tory", : 1965, Age: 59 y.o., MRN: [id number], Sex: male    Diagnosis  Pre-op Diagnosis      * Benign prostatic hyperplasia with urinary retention [N40.1, R33.8] Post-op Diagnosis     * Benign prostatic hyperplasia with urinary retention [N40.1, R33.8]     Procedures  Greenlight Photoselective Vaporization of Prostate (Greenlight)  18259 - NM LASER VAPORIZATION OF PROSTATE FOR URINE FLOW      Surgeons      * Annie Gaines - Primary    Resident/Fellow/Other Assistant:  Surgeons and Role:     * Ella Hoang MD - Resident - Assisting    Staff:   Relief Circulator: Taylor  Circulator: Angelica Garces Person: Caridad    Anesthesia Staff: Anesthesiologist: Jose Maria Burt DO    Procedure Summary  Anesthesia: General  ASA: III  Estimated Blood Loss: 5mL  Intra-op Medications: Administrations occurring from 1050 to 1210 on 25:  * No intraprocedure medications in log *        Specimen: No specimens collected              Drains and/or Catheters:   Urethral Catheter Coude 20 Fr. (Active)       Tourniquet Times:         Implants:     Findings: BPH    Indications: Blayne Covarrubias \"Tory" is an 59 y.o. male who is having surgery for Benign prostatic hyperplasia with urinary retention [N40.1, R33.8].     The patient was seen in the preoperative area. The risks, benefits, complications, treatment options, non-operative alternatives, expected recovery and outcomes were discussed with the patient. The possibilities of reaction to medication, pulmonary aspiration, injury to surrounding structures, bleeding, recurrent infection, the need for additional procedures, failure to diagnose a condition, and creating a complication requiring transfusion or operation were discussed with the patient. The patient concurred with the proposed plan, giving informed consent.  The site of surgery " was properly noted/marked if necessary per policy. The patient has been actively warmed in preoperative area. Preoperative antibiotics have been ordered and given within 1 hours of incision. Venous thrombosis prophylaxis have been ordered including bilateral sequential compression devices    Procedure Details: After obtaining informed consent, the patient was brought to the operating room, placed on the operating table in a supine position. Preoperative time-out was performed.  Preoperative urine culture was reviewed.  The patient received antibiotic intraoperatively for operative prophylaxis. Pt underwent general anesthesia without complication.  The patient was repositioned into the dorsal lithotomy position and prepped and draped in the usual sterile fashion.  A 22-Lebanese continuous-flow laser cystoscope was inserted into the  bladder via the urethra atraumatically using a visual obturator, pan cystoscopy was performed.  There were no abnormal tumors or lesions noted in the bladder.  Bilateral ureteral orifices were identified in the normal orthotopic position.  The scope was then replaced into the prostatic fossa.  There was bilobar prostatic enlargement.  There was no intravesical median lobe.  At this time, we took out the visual obturator and inserted a continuous-flow laser working element.  A 180 watt GreenLight laser was then inserted.  We then proceeded to vaporize the prostatic tissue.  Our vaporization was started on the right lateral lobe at the level of the bladder neck and vaporization was carried down to the level of the verumontanum distally.  Care was taken at our proximal landmark to visualize the ureteral orifices at all times so that they would be excluded from our vaporization.  The right lateral lobe was vaporized down to the level of the prostatic capsule.  Attention was then turned to the left side and the procedure was repeated here.  Again, we took care to perform vaporization from the  level of the bladder neck down to the level of the verumontanum, avoiding the external urinary sphincter.   At the conclusion of the procedure, there was a large TUR-like defect urinary channel that was wide open.  The ureteral orifices could be identified orthotopically, again outside of our vaporization and unharmed.  The external sphincter was also noted to be intact.  At the conclusion of the procedure, again the patient's bladder was emptied.  The cystoscope was removed from the bladder, and a single-lumen catheter was then placed and 20 cc of sterile water were placed in the balloon with return of clear pink-tinged urine.  The patient was then awoken from general anesthesia, taken out of the lithotomy, and transferred to the PACU in stable condition.    Evidence of Infection: No   Complications:  None; patient tolerated the procedure well.    Disposition: PACU - hemodynamically stable.  Condition: stable                 Additional Details:     Attending Attestation: I was present and scrubbed for the entire procedure.    Annie Gaines  Phone Number: [phone]

## 2025-07-25 NOTE — ANESTHESIA POSTPROCEDURE EVALUATION
"Patient: Blayne Covarrubias \"Kojo\"    Procedure Summary       Date: 07/25/25 Room / Location: [facility] 02 / [facility]    Anesthesia Start: 0926 Anesthesia Stop:     Procedure: Greenlight Photoselective Vaporization of Prostate (Greenlight) Diagnosis:       Benign prostatic hyperplasia with urinary retention      (Benign prostatic hyperplasia with urinary retention [N40.1, R33.8])    Surgeons: Annie Gaines MD Responsible Provider: Jose Maria Burt DO    Anesthesia Type: general ASA Status: 3            Anesthesia Type: general    Vitals Value Taken Time   /66 07/25/25 11:10   Temp 36 °C (96.8 °F) 07/25/25 10:40   Pulse 50 07/25/25 11:10   Resp 14 07/25/25 11:10   SpO2 100 % 07/25/25 11:10       Anesthesia Post Evaluation    Patient location during evaluation: bedside  Patient participation: complete - patient participated  Level of consciousness: awake and alert  Pain management: adequate  Multimodal analgesia pain management approach  Airway patency: patent  Two or more strategies used to mitigate risk of obstructive sleep apnea  Cardiovascular status: acceptable  Respiratory status: acceptable  Hydration status: acceptable  Postoperative Nausea and Vomiting: none        No notable events documented.    "

## 2025-07-28 ENCOUNTER — APPOINTMENT (OUTPATIENT)
Age: 60
End: 2025-07-28
Payer: COMMERCIAL

## 2025-07-28 VITALS — TEMPERATURE: 97.7 F | DIASTOLIC BLOOD PRESSURE: 72 MMHG | HEART RATE: 72 BPM | SYSTOLIC BLOOD PRESSURE: 147 MMHG

## 2025-07-28 DIAGNOSIS — R33.9 URINARY RETENTION: Primary | ICD-10-CM

## 2025-07-28 PROCEDURE — 99024 POSTOP FOLLOW-UP VISIT: CPT

## 2025-07-28 NOTE — PROGRESS NOTES
[facility]  Outpatient Clinic Note    HISTORY OF PRESENT ILLNESS:   Blayne Covarrubias is a 59 y.o. male who arrives today for TOV. Pt has history of BPH with LUTS and large capacity bladder with significant retention. He recently underwent greenlight with Dr. Gaines on 7/25. Patient doing well this AM, without complaints at this time.    Subjective   PAST MEDICAL HISTORY:  Medical History[1]    PAST SURGICAL HISTORY:  Surgical History[2]     ALLERGIES:   Allergies[3]     MEDICATIONS:   Medications Ordered Prior to Encounter[4]     SOCIAL HISTORY:  Patient  reports that he has never smoked. He has never used smokeless tobacco. He reports that he does not currently use alcohol. He reports that he does not use drugs.   Social History     Socioeconomic History    Marital status: Single     Spouse name: Not on file    Number of children: Not on file    Years of education: Not on file    Highest education level: Not on file   Occupational History    Not on file   Tobacco Use    Smoking status: Never    Smokeless tobacco: Never   Substance and Sexual Activity    Alcohol use: Not Currently    Drug use: Never    Sexual activity: Not Currently     Partners: Female     Birth control/protection: None   Other Topics Concern    Not on file   Social History Narrative    Not on file     Social Drivers of Health     Financial Resource Strain: Not on file   Food Insecurity: Not on file   Transportation Needs: Not on file   Physical Activity: Not on file   Stress: Not on file   Social Connections: Not on file   Intimate Partner Violence: Not on file   Housing Stability: Not on file       FAMILY HISTORY:  Family History[5]    REVIEW OF SYSTEMS:  All systems reviewed. Anything negative noted in the HPI.    Objective   PHYSICAL EXAM:  Visit Vitals  /72   Pulse 72   Temp 36.5 °C (97.7 °F) (Temporal)     Constitutional: Appearance: Well-developed.  HENT: Head: Normocephalic and atraumatic.  Neck: Normal range of  motion.  Pulmonary: Pulmonary effort is normal.  Musculoskeletal: Normal range of motion.  Skin: General: Skin is warm and dry.  Neurological: Alert and oriented to person, place, and time.  Psychiatric: Normal mood and affect. Thought content normal.      LABORATORY REVIEW:   Lab Results   Component Value Date    BUN 19 07/10/2025    CREATININE 1.32 07/10/2025    EGFR 62 07/10/2025     07/10/2025    K 4.5 07/10/2025     07/10/2025    CO2 25 07/10/2025    CALCIUM 9.7 07/10/2025      Lab Results   Component Value Date    WBC 6.0 07/10/2025    RBC 4.99 07/10/2025    HGB 15.2 07/10/2025    HCT 44.6 07/10/2025    MCV 89.4 07/10/2025    MCH 30.5 07/10/2025    MCHC 34.1 07/10/2025    RDW 11.8 07/10/2025     07/10/2025    MPV 10.4 07/10/2025        Lab Results   Component Value Date    PSA 1.29 04/25/2024    PSA 0.72 05/17/2022    PSA 1.07 11/17/2020    PSA 0.98 11/12/2019    PSA 0.88 04/26/2018      Lab Results   Component Value Date    COLORU Light-Yellow 06/24/2025    APPEARANCEU Hazy (A) 06/24/2025    SPECGRAVU <=1.005 06/24/2025    GARTH 7.0 06/24/2025    PROTUR NEGATIVE 06/24/2025    GLUCOSEU NEGATIVE 06/24/2025    KETONESU NEGATIVE 06/24/2025    BLOODU TRACE-Intact (A) 06/24/2025    BILIRUBINU NEGATIVE 06/24/2025    UROBILINOGEN 0.2 06/24/2025    UROBILINOGEN LARGE (3+) (A) 06/24/2025    NITRITEU POSITIVE (A) 06/24/2025     Lab Results   Component Value Date    URINECULTURE SEE NOTE 07/10/2025    URINECULTURE SEE NOTE 06/24/2025    URINECULTURE SEE NOTE 05/27/2025     Assessment/Plan   Assessment & Plan:     Problem List Items Addressed This Visit       Urinary retention - Primary    Relevant Orders    Voiding Trial     Blayne Covarrubias is a 59 y.o. here today for TOV s/p greenlight with Dr. Gaines. Patient had TOV performed with 250mls instilled and 300mls out. No complaints of dysuria at this time. Patient educated to finish antibiotic prescription. Patient educated that he may have some leakage  postoperatively, and if so should improve over time. We also discussed return precautions and signs of urinary retention and if they do occur he can return to the clinic by the end of the day or to the ED then on.    Plan:  TOV passed, follow up as needed.    All questions and concerns were addressed. Patient verbalizes understanding and has no other questions at this time.   You are able to access your chart online. You can sign into [email or web address] or add the [employer] [facility] [employer] luke on your smart phone to review today's visit, laboratory work and imaging.     Dali Paul, [facility]  [facility]  Office Phone: [phone]         [1]   Past Medical History:  Diagnosis Date    Acute prostatitis 03/08/2017    Acute bacterial prostatitis    Agatston coronary artery calcium score between 100 and 199 03/25/2024     (2023)    Alcohol dependence, uncomplicated (Multi) 01/17/2018    Alcoholism, chronic    Aneurysm of ascending aorta without rupture 03/25/2024    Asc Ao 4.1 (2023)    Personal history of other specified conditions     History of insomnia    Personal history of other specified conditions 11/12/2019    History of chronic cough    Unspecified abnormal findings in urine 03/08/2017    Malodorous urine    Urgency of urination 03/08/2017    Urinary urgency    Urinary tract infection, site not specified 03/08/2017    Bacterial UTI   [2]   Past Surgical History:  Procedure Laterality Date    CT GUIDED CHEST TUBE PLACEMENT  10/18/2018    [place] GUIDED [place]ST TU[place]AC[place] [facility] [place]    CT GUIDED PERCUTANEOUS BIOPSY LUNG  10/18/2018    CT GUIDED PERCUTANEOUS BIOPSY LUNG [place] [facility] [place]    OTHER SURGICAL HISTORY  10/23/2014    Prior Surgical Procedure Not Done    OTHER SURGICAL HISTORY  10/26/2018    Biopsy Lung Percutaneous    OTHER SURGICAL HISTORY  10/26/2018    Bronchoscopy (Diagnostic)    OTHER SURGICAL HISTORY  10/26/2018    Chest Tube Insertion    OTHER SURGICAL HISTORY  11/12/2018     Lung segmental resection   [3] No Known Allergies  [4]   Current Outpatient Medications on File Prior to Visit   Medication Sig Dispense Refill    cephalexin (Keflex) 500 mg capsule Take 1 capsule (500 mg) by mouth 4 times a day for 5 days. 20 capsule 0    disulfiram (Antabuse) 250 mg tablet Take 1 tablet (250 mg) by mouth once daily. 90 tablet 1    multivitamin tablet Take 1 tablet by mouth once daily.      phenazopyridine (Pyridium) 200 mg tablet Take 1 tablet (200 mg) by mouth 3 times a day as needed for bladder spasms for up to 5 days. 15 tablet 0    QUEtiapine (SEROquel) 25 mg tablet Take 1 tablet (25 mg) by mouth once daily at bedtime. 90 tablet 1    rosuvastatin (Crestor) 10 mg tablet Take 1 tablet (10 mg) by mouth once daily. 90 tablet 1    [DISCONTINUED] tamsulosin (Flomax) 0.4 mg 24 hr capsule Take 1 capsule (0.4 mg) by mouth once daily. Do not crush, chew, or split. 30 capsule 11     No current facility-administered medications on file prior to visit.   [5] No family history on file.

## 2025-08-07 ENCOUNTER — APPOINTMENT (OUTPATIENT)
Dept: PRIMARY CARE | Facility: CLINIC | Age: 60
End: 2025-08-07
Payer: COMMERCIAL

## 2025-08-07 VITALS
WEIGHT: 231 LBS | HEART RATE: 68 BPM | HEIGHT: 75 IN | BODY MASS INDEX: 28.72 KG/M2 | SYSTOLIC BLOOD PRESSURE: 122 MMHG | DIASTOLIC BLOOD PRESSURE: 70 MMHG

## 2025-08-07 DIAGNOSIS — N40.1 BENIGN PROSTATIC HYPERPLASIA WITH URINARY RETENTION: ICD-10-CM

## 2025-08-07 DIAGNOSIS — Z00.00 ANNUAL PHYSICAL EXAM: Primary | ICD-10-CM

## 2025-08-07 DIAGNOSIS — F10.10 ALCOHOL ABUSE: ICD-10-CM

## 2025-08-07 DIAGNOSIS — G47.00 PERSISTENT INSOMNIA: ICD-10-CM

## 2025-08-07 DIAGNOSIS — R33.8 BENIGN PROSTATIC HYPERPLASIA WITH URINARY RETENTION: ICD-10-CM

## 2025-08-07 DIAGNOSIS — E78.2 MIXED HYPERLIPIDEMIA: ICD-10-CM

## 2025-08-07 RX ORDER — QUETIAPINE FUMARATE 25 MG/1
25 TABLET, FILM COATED ORAL NIGHTLY
Qty: 90 TABLET | Refills: 1 | Status: SHIPPED | OUTPATIENT
Start: 2025-08-07 | End: 2026-02-03

## 2025-08-07 RX ORDER — ROSUVASTATIN CALCIUM 10 MG/1
10 TABLET, COATED ORAL DAILY
Qty: 90 TABLET | Refills: 1 | Status: SHIPPED | OUTPATIENT
Start: 2025-08-07

## 2025-08-07 RX ORDER — DISULFIRAM 250 MG/1
250 TABLET ORAL DAILY
Qty: 90 TABLET | Refills: 1 | Status: SHIPPED | OUTPATIENT
Start: 2025-08-07 | End: 2026-02-03

## 2025-08-07 NOTE — PROGRESS NOTES
"Subjective   Patient ID: Blayne Covarrubias \"Kojo\" is a 59 y.o. male who presents for Hyperlipidemia and Insomnia.    Past Medical, Surgical, and Family History reviewed and updated in chart.    Reviewed all medications by prescribing practitioner or clinical pharmacist (such as prescriptions, OTCs, herbal therapies and supplements) and documented in the medical record.    HPI  1.  Physical exam.  Overall patient is doing well.   Immunization: Tdap 5/2021; influenza vaccine yearly  Shingrix UTD 2 doses; PCV 20 recommended  COVID-19 vaccine (Pfizer Moderna) 5 doses  Colon Cancer Screening: No family history, colonoscopy done in 2023 with 5-year clearance  Diet: Regular diet  Exercise: Goes to the gym 3-4 times a week  Tobacco: Denies use  EtOH: Rarely Socially     Other problems/diagnoses addressed and reviewed during this encounter      2.  Alcohol abuse history.  Sober for 20+ years  Uses Disulfiram 250mg every day  Initially prescribed by psychiatry, and it has continued through this office  At this juncture, not willing to discontinue    3.  Insomnia.  Controlled on Quetiapine 25mg every day  Unable to fall sleep without it, requesting refill    4.  Hyperlipidemia.  Currently taking rosuvastatin 10 mg daily  Denies any medication side effects including myalgias  Denies ACS symptoms    5.  Ascending thoracic aorta aneurysm.  Continues to follow with cardiology annually  Recent echocardiogram done March 25, 2024  Ascending aortic diameter noted to be 4.03 cm    6.  BPH with LUTS  TOV s/p greenlight with Dr. Gaines on July 25, 2025  Still recovering with minimal blood in the urine    Review of Systems  All pertinent positive symptoms are included in the history of present illness.    All other systems have been reviewed and are negative and noncontributory to this patient's current ailments.    Past Medical History:   Diagnosis Date    Acute prostatitis 03/08/2017    Acute bacterial prostatitis    Agatston coronary artery " calcium score between 100 and 199 03/25/2024     (2023)    Alcohol dependence, uncomplicated (Multi) 01/17/2018    Alcoholism, chronic    Aneurysm of ascending aorta without rupture 03/25/2024    Asc Ao 4.1 (2023)    Personal history of other specified conditions     History of insomnia    Personal history of other specified conditions 11/12/2019    History of chronic cough    Unspecified abnormal findings in urine 03/08/2017    Malodorous urine    Urgency of urination 03/08/2017    Urinary urgency    Urinary tract infection, site not specified 03/08/2017    Bacterial UTI     Past Surgical History:   Procedure Laterality Date    CT GUIDED CHEST TUBE PLACEMENT  10/18/2018    CT GUIDED CHEST TUBE PLACEMENT 10[place]18/2018 [facility] [place] [facility]    CT GUIDED PERCUTANEOUS BIOPSY LUNG  10/18/2018    CT GUIDED PERCUTANEOUS BIOPSY LUNG 10[place]18/2018 [facility] [place] [facility]    OTHER SURGICAL HISTORY  10/23/2014    Prior Surgical Procedure Not Done    OTHER SURGICAL HISTORY  10/26/2018    Biopsy Lung Percutaneous    OTHER SURGICAL HISTORY  10/26/2018    Bronchoscopy (Diagnostic)    OTHER SURGICAL HISTORY  10/26/2018    Chest Tube Insertion    OTHER SURGICAL HISTORY  11/12/2018    Lung segmental resection     Social History     Tobacco Use    Smoking status: Never    Smokeless tobacco: Never   Substance Use Topics    Alcohol use: Not Currently    Drug use: Never     No family history on file.  Immunization History   Administered Date(s) Administered    COVID-19, mRNA, LNP-S, PF, 30 mcg/0.3 mL dose 03/22/2021, 04/19/2021, 12/14/2021    DTaP vaccine, pediatric  (INFANRIX) 05/27/2021    Flu vaccine (IIV4), preservative free *Check age/dose* 10/09/2018    Flu vaccine, quadrivalent, no egg protein, age 6 month or greater (FLUCELVAX) 12/14/2021, 10/27/2022    Flu vaccine, trivalent, preservative free, age 6 months and greater (Fluarix/Fluzone/Flulaval) 09/20/2015, 09/25/2016    Flu vaccine, trivalent, preservative free, no egg protein, age 6 months  "or greater (Flucelvax) 10/08/2024    Influenza, injectable, quadrivalent 09/20/2019    Pfizer COVID-19 vaccine, 12 years and older, (30mcg/0.3mL) (Comirnaty) 10/08/2024    Pfizer COVID-19 vaccine, bivalent, age 12 years and older (30 mcg/0.3 mL) 10/27/2022    Tdap vaccine, age 7 year and older (BOOSTRIX, ADACEL) 11/25/2015, 05/27/2021    Zoster vaccine, recombinant, adult (SHINGRIX) 12/30/2022, 04/25/2024     Current Outpatient Medications   Medication Instructions    disulfiram (ANTABUSE) 250 mg, oral, Daily    multivitamin tablet 1 tablet, Daily    QUEtiapine (SEROQUEL) 25 mg, oral, Nightly    rosuvastatin (CRESTOR) 10 mg, oral, Daily     No Known Allergies    Objective   Vitals:    08/07/25 1050   BP: 122/70   Pulse: 68   Weight: 105 kg (231 lb)   Height: 1.905 m (6' 3\")       Body mass index is 28.87 kg/m².    BP Readings from Last 3 Encounters:   08/07/25 122/70   07/28/25 147/72   07/25/25 137/68      Wt Readings from Last 3 Encounters:   08/07/25 105 kg (231 lb)   07/25/25 104 kg (228 lb 2.8 oz)   07/10/25 105 kg (231 lb 11.3 oz)        Admission on 07/25/2025, Discharged on 07/25/2025   Component Date Value    POCT Glucose 07/25/2025 93    Orders Only on 07/11/2025   Component Date Value    GLUCOSE 07/10/2025 88     UREA NITROGEN (BUN) 07/10/2025 19     CREATININE 07/10/2025 1.32     EGFR 07/10/2025 62     BUN/CREATININE RATIO 07/10/2025 SEE NOTE:     SODIUM 07/10/2025 139     POTASSIUM 07/10/2025 4.5     CHLORIDE 07/10/2025 104     CARBON DIOXIDE 07/10/2025 25     ELECTROLYTE BALANCE 07/10/2025 10     CALCIUM 07/10/2025 9.7     WHITE BLOOD CELL COUNT 07/10/2025 6.0     RED BLOOD CELL COUNT 07/10/2025 4.99     HEMOGLOBIN 07/10/2025 15.2     HEMATOCRIT 07/10/2025 44.6     MCV 07/10/2025 89.4     MCH 07/10/2025 30.5     MCHC 07/10/2025 34.1     RDW 07/10/2025 11.8     PLATELET COUNT 07/10/2025 229     MPV 07/10/2025 10.4     INR 07/10/2025 1.1     PT 07/10/2025 11.3     CULTURE, URINE, ROUTINE 07/10/2025 SEE " NOTE    Pre-Admission Testing on 07/10/2025   Component Date Value    Ventricular Rate 07/10/2025 66     Atrial Rate 07/10/2025 66     VT Interval 07/10/2025 162     QRS Duration 07/10/2025 102     QT Interval 07/10/2025 406     QTC Calculation(Bazett) 07/10/2025 425     P Axis 07/10/2025 55     R Axis 07/10/2025 -22     T Axis 07/10/2025 43     QRS Count 07/10/2025 10     Q Onset 07/10/2025 220     P Onset 07/10/2025 139     P Offset 07/10/2025 200     T Offset 07/10/2025 423     QTC Fredericia 07/10/2025 419      Physical Exam  Constitutional:       General: He is not in acute distress.     Appearance: Normal appearance. He is normal weight. He is not ill-appearing.   HENT:      Head: Normocephalic and atraumatic.      Nose: No congestion or rhinorrhea.      Mouth/Throat:      Mouth: Mucous membranes are moist.      Pharynx: Oropharynx is clear. No oropharyngeal exudate or posterior oropharyngeal erythema.     Eyes:      General: No scleral icterus.     Extraocular Movements: Extraocular movements intact.      Conjunctiva/sclera: Conjunctivae normal.      Pupils: Pupils are equal, round, and reactive to light.       Cardiovascular:      Rate and Rhythm: Normal rate and regular rhythm.      Pulses: Normal pulses.      Heart sounds: Normal heart sounds. No murmur heard.  Pulmonary:      Effort: Pulmonary effort is normal. No respiratory distress.      Breath sounds: Normal breath sounds. No wheezing, rhonchi or rales.   Abdominal:      General: Abdomen is flat. Bowel sounds are normal.      Palpations: Abdomen is soft.      Tenderness: There is no abdominal tenderness. There is no guarding or rebound.      Hernia: No hernia is present.     Musculoskeletal:         General: Normal range of motion.      Right lower leg: No edema.      Left lower leg: No edema.     Skin:     General: Skin is warm.      Capillary Refill: Capillary refill takes less than 2 seconds.      Findings: No lesion or rash.     Neurological:       General: No focal deficit present.      Mental Status: He is alert and oriented to person, place, and time.     Psychiatric:         Mood and Affect: Mood normal.         Behavior: Behavior normal.           Assessment/Plan   Diagnoses and all orders for this visit:  Annual physical exam  Complete history and physical examination was performed  Discussed recent lab work including CBC, BMP, lipid panel, and PSA which were normal.  Will repeat lab work during next visit  Colonoscopy due in 2028  Prevnar 20 recommended otherwise up-to-date with other immunization.  Educated about importance of conducting healthy lifestyle, following well-balanced diet and exercising regularly.        Alcohol abuse  -     disulfiram (Antabuse) 250 mg tablet; Take 1 tablet (250 mg) by mouth once daily.  Stable, will continue same regimen.  Refill sent to the pharmacy.    Persistent insomnia  -     QUEtiapine (SEROquel) 25 mg tablet; Take 1 tablet (25 mg) by mouth once daily at bedtime.  Stable, will continue same regimen.  Refill sent to the pharmacy.    Mixed hyperlipidemia  -     rosuvastatin (Crestor) 10 mg tablet; Take 1 tablet (10 mg) by mouth once daily.  Stable, will continue same regimen.  Refill sent to the pharmacy.    Benign prostatic hyperplasia with urinary retention   Continue following with urology, factor recommendations    Thank you for letting us be a part of your care team.  Please call the office if you have further questions or concerns regarding your care    Otherwise, please follow-up in 6 months for continued care and refills     Mary Aguilar MD  PGY3,  Resident  08/07/25

## 2025-10-29 ENCOUNTER — APPOINTMENT (OUTPATIENT)
Dept: UROLOGY | Facility: CLINIC | Age: 60
End: 2025-10-29
Payer: COMMERCIAL

## (undated) DEVICE — GOWN, SURGICAL, SIRUS, NON REINFORCED, LARGE

## (undated) DEVICE — BLANKET, LOWER BODY, VHA PLUS, ADULT

## (undated) DEVICE — Device

## (undated) DEVICE — SPONGE, GAUZE, AVANT, STERILE, NONWOVEN, 4PLY, 4 X 4, STANDARD

## (undated) DEVICE — GLOVE, SURGICAL, PROTEXIS PI , 6.5, PF, LF

## (undated) DEVICE — FIBER, MOXY, LIQUID COOLED

## (undated) DEVICE — WATER, STERILE, FOR IRRIGATION USP, 500MI AQUALITE

## (undated) DEVICE — SYRINGE, 50 CC, IRRIGATION, CATHETER TIP, DISPOSABLE, STERILE, LF

## (undated) DEVICE — BAG, DRAINAGE, ANTI-REFLUX CHAMBER, 2000ML

## (undated) DEVICE — IRRIGATION SET, CYSTOSCOPY, F/CONSTANT/INTERMITTENT, 8 GTT/CC, 77 IN

## (undated) DEVICE — BAG, DRAINAGE, CONTINUOUS IRRIGATION, 4L

## (undated) DEVICE — CATHETER, URETHRAL, FOLEY, 2 WAY, BARDEX LUBRICATH, SHORT LENGTH, 22 FR, 5 CC, LATEX